# Patient Record
Sex: MALE | Race: WHITE | Employment: FULL TIME | URBAN - METROPOLITAN AREA
[De-identification: names, ages, dates, MRNs, and addresses within clinical notes are randomized per-mention and may not be internally consistent; named-entity substitution may affect disease eponyms.]

---

## 2017-09-27 ENCOUNTER — ALLIED HEALTH/NURSE VISIT (OUTPATIENT)
Dept: FAMILY MEDICINE | Facility: CLINIC | Age: 42
End: 2017-09-27
Payer: COMMERCIAL

## 2017-09-27 DIAGNOSIS — Z23 NEED FOR PROPHYLACTIC VACCINATION AND INOCULATION AGAINST INFLUENZA: Primary | ICD-10-CM

## 2017-09-27 PROCEDURE — 90471 IMMUNIZATION ADMIN: CPT

## 2017-09-27 PROCEDURE — 99207 ZZC NO CHARGE NURSE ONLY: CPT

## 2017-09-27 PROCEDURE — 90686 IIV4 VACC NO PRSV 0.5 ML IM: CPT

## 2017-09-27 NOTE — PROGRESS NOTES
Injectable Influenza Immunization Documentation    1.  Is the person to be vaccinated sick today?   No    2. Does the person to be vaccinated have an allergy to a component   of the vaccine?   No    3. Has the person to be vaccinated ever had a serious reaction   to influenza vaccine in the past?   No    4. Has the person to be vaccinated ever had Guillain-Barré syndrome?   No    Form completed by Zeynep Luna CMA

## 2017-09-27 NOTE — MR AVS SNAPSHOT
After Visit Summary   9/27/2017    Pavel Perez    MRN: [id number]           Patient Information     Date Of Birth          1975        Visit Information        Provider Department      9/27/2017 3:30 PM RG FLU SHOT [place] [facility]        Today's Diagnoses     Need for prophylactic vaccination and inoculation against influenza    -  1       Follow-ups after your visit        Who to contact     If you have questions or need follow up information about today's clinic visit or your schedule please contact [facility] [place] directly at [phone].  Normal or non-critical lab and imaging results will be communicated to you by Ember Entertainmenthart, letter or phone within 4 business days after the clinic has received the results. If you do not hear from us within 7 days, please contact the clinic through Pipettet or phone. If you have a critical or abnormal lab result, we will notify you by phone as soon as possible.  Submit refill requests through SkyFuel or call your pharmacy and they will forward the refill request to us. Please allow 3 business days for your refill to be completed.          Additional Information About Your Visit        MyChart Information     [employer] gives you secure access to your electronic health record. If you see a primary care provider, you can also send messages to your care team and make appointments. If you have questions, please call your primary care clinic.  If you do not have a primary care provider, please call [phone] and they will assist you.        Care EveryWhere ID     This is your Care EveryWhere ID. This could be used by other organizations to access your [place] medical records  [id number]         Blood Pressure from Last 3 Encounters:   02/08/16 120/82   09/17/15 128/84   12/19/13 128/80    Weight from Last 3 Encounters:   02/08/16 232 lb 6.4 oz (105.4 kg)   09/17/15 239 lb 3.2 oz (108.5 kg)   12/19/13 236 lb 12.8 oz (107.4 kg)              We  Performed the Following     FLU VAC, SPLIT VIRUS IM > 3 YO (QUADRIVALENT) [90197]     Vaccine Administration, Initial [74874]        Primary Care Provider Office Phone # Fax #    Amrita Garcia [phone] [address]  [place] [address]        Equal Access to Services     [facility]: Hadii aad ku hadasho Soomaali, waaxda luqadaha, qaybta kaalmada adeegyada, waxay idiin hayaan adeeg kharash lacoran . So [facility] [phone].    ATENCIÓN: Si habla español, tiene a cain disposición servicios gratuitos de asistencia lingüística. Opal al [phone].    We comply with applicable federal civil rights laws and [place] laws. We do not discriminate on the basis of race, color, national origin, age, disability sex, sexual orientation or gender identity.            Thank you!     Thank you for choosing [facility]  for your care. Our goal is always to provide you with excellent care. Hearing back from our patients is one way we can continue to improve our services. Please take a few minutes to complete the written survey that you may receive in the mail after your visit with us. Thank you!             Your Updated Medication List - Protect others around you: Learn how to safely use, store and throw away your medicines at www.disposemymeds.org.          This list is accurate as of: 9/27/17  4:21 PM.  Always use your most recent med list.                   Brand Name Dispense Instructions for use Diagnosis    CLARITIN-D 24 HOUR  MG per 24 hr tablet   Generic drug:  loratadine-pseudoePHEDrine     90 tablet    TAKE 1 TABLET BY MOUTH EVERY DAY    Seasonal allergic rhinitis       NAPHCON 0.012 % ophthalmic solution   Generic drug:  naphazoline      1 drop 4 times daily.        triamcinolone 0.1 % cream    KENALOG    60 g    Apply  topically 2 times daily.    Eczema       TYLENOL 325 MG tablet   Generic drug:  acetaminophen      as needed

## 2018-10-23 ENCOUNTER — ALLIED HEALTH/NURSE VISIT (OUTPATIENT)
Dept: FAMILY MEDICINE | Facility: CLINIC | Age: 43
End: 2018-10-23
Payer: COMMERCIAL

## 2018-10-23 DIAGNOSIS — Z23 NEED FOR PROPHYLACTIC VACCINATION AND INOCULATION AGAINST INFLUENZA: Primary | ICD-10-CM

## 2018-10-23 PROCEDURE — 90471 IMMUNIZATION ADMIN: CPT

## 2018-10-23 PROCEDURE — 99207 ZZC NO CHARGE NURSE ONLY: CPT

## 2018-10-23 PROCEDURE — 90686 IIV4 VACC NO PRSV 0.5 ML IM: CPT

## 2018-10-23 NOTE — MR AVS SNAPSHOT
After Visit Summary   10/23/2018    Pavel Perez    MRN: [id number]           Patient Information     Date Of Birth          1975        Visit Information        Provider Department      10/23/2018 3:30 PM RG FLU SHOT [place] [facility] Gamboa        Today's Diagnoses     Need for prophylactic vaccination and inoculation against influenza    -  1       Follow-ups after your visit        Who to contact     If you have questions or need follow up information about today's clinic visit or your schedule please contact [facility] [place] directly at [phone].  Normal or non-critical lab and imaging results will be communicated to you by NativeEnergyhart, letter or phone within 4 business days after the clinic has received the results. If you do not hear from us within 7 days, please contact the clinic through Nebulat or phone. If you have a critical or abnormal lab result, we will notify you by phone as soon as possible.  Submit refill requests through Geomagic or call your pharmacy and they will forward the refill request to us. Please allow 3 business days for your refill to be completed.          Additional Information About Your Visit        MyChart Information     [employer] gives you secure access to your electronic health record. If you see a primary care provider, you can also send messages to your care team and make appointments. If you have questions, please call your primary care clinic.  If you do not have a primary care provider, please call [phone] and they will assist you.        Care EveryWhere ID     This is your Care EveryWhere ID. This could be used by other organizations to access your [place] medical records  [id number]         Blood Pressure from Last 3 Encounters:   02/08/16 120/82   09/17/15 128/84   12/19/13 128/80    Weight from Last 3 Encounters:   02/08/16 232 lb 6.4 oz (105.4 kg)   09/17/15 239 lb 3.2 oz (108.5 kg)   12/19/13 236 lb 12.8 oz (107.4 kg)              We  Performed the Following     FLU VACCINE, SPLIT VIRUS, IM (QUADRIVALENT) [58715]- >3 YRS     Vaccine Administration, Initial [47212]        Primary Care Provider Office Phone # Fax #    Amrita Garcia [phone] [address] [facility] [address] [address]        Equal Access to Services     [facility]: Hadii aad ku hadasho Soomaali, waaxda luqadaha, qaybta kaalmada adeegyada, waxay idiin hayaan adeeg kharash laChipaan . So [facility] [phone].    ATENCIÓN: Si habla español, tiene a cain disposición servicios gratuitos de asistencia lingüística. Opal al [phone].    We comply with applicable federal civil rights laws and [place] laws. We do not discriminate on the basis of race, color, national origin, age, disability, sex, sexual orientation, or gender identity.            Thank you!     Thank you for choosing [facility]  for your care. Our goal is always to provide you with excellent care. Hearing back from our patients is one way we can continue to improve our services. Please take a few minutes to complete the written survey that you may receive in the mail after your visit with us. Thank you!             Your Updated Medication List - Protect others around you: Learn how to safely use, store and throw away your medicines at www.disposemymeds.org.          This list is accurate as of 10/23/18  3:52 PM.  Always use your most recent med list.                   Brand Name Dispense Instructions for use Diagnosis    CLARITIN-D 24 HOUR  MG per 24 hr tablet   Generic drug:  loratadine-pseudoePHEDrine     90 tablet    TAKE 1 TABLET BY MOUTH EVERY DAY    Seasonal allergic rhinitis       NAPHCON 0.012 % ophthalmic solution   Generic drug:  naphazoline      1 drop 4 times daily.        triamcinolone 0.1 % cream    KENALOG    60 g    Apply  topically 2 times daily.    Eczema       TYLENOL 325 MG tablet   Generic drug:  acetaminophen      as needed

## 2018-10-23 NOTE — PROGRESS NOTES

## 2019-09-11 ENCOUNTER — ANCILLARY PROCEDURE (OUTPATIENT)
Dept: GENERAL RADIOLOGY | Facility: CLINIC | Age: 44
End: 2019-09-11
Attending: PHYSICIAN ASSISTANT
Payer: COMMERCIAL

## 2019-09-11 ENCOUNTER — OFFICE VISIT (OUTPATIENT)
Dept: FAMILY MEDICINE | Facility: CLINIC | Age: 44
End: 2019-09-11
Payer: COMMERCIAL

## 2019-09-11 VITALS
DIASTOLIC BLOOD PRESSURE: 64 MMHG | WEIGHT: 239.8 LBS | BODY MASS INDEX: 35.52 KG/M2 | RESPIRATION RATE: 20 BRPM | SYSTOLIC BLOOD PRESSURE: 116 MMHG | HEART RATE: 95 BPM | HEIGHT: 69 IN | TEMPERATURE: 98.5 F | OXYGEN SATURATION: 94 %

## 2019-09-11 DIAGNOSIS — J18.9 PNEUMONIA OF RIGHT LOWER LOBE DUE TO INFECTIOUS ORGANISM: Primary | ICD-10-CM

## 2019-09-11 DIAGNOSIS — R05.9 COUGH: ICD-10-CM

## 2019-09-11 DIAGNOSIS — R50.9 FEVER AND CHILLS: ICD-10-CM

## 2019-09-11 DIAGNOSIS — E66.01 MORBID OBESITY (H): ICD-10-CM

## 2019-09-11 LAB
BASOPHILS # BLD AUTO: 0 10E9/L (ref 0–0.2)
BASOPHILS NFR BLD AUTO: 0.3 %
DIFFERENTIAL METHOD BLD: ABNORMAL
EOSINOPHIL # BLD AUTO: 0 10E9/L (ref 0–0.7)
EOSINOPHIL NFR BLD AUTO: 0.4 %
ERYTHROCYTE [DISTWIDTH] IN BLOOD BY AUTOMATED COUNT: 12.6 % (ref 10–15)
HCT VFR BLD AUTO: 44.5 % (ref 40–53)
HGB BLD-MCNC: 15.2 G/DL (ref 13.3–17.7)
LYMPHOCYTES # BLD AUTO: 0.6 10E9/L (ref 0.8–5.3)
LYMPHOCYTES NFR BLD AUTO: 8 %
MCH RBC QN AUTO: 29.6 PG (ref 26.5–33)
MCHC RBC AUTO-ENTMCNC: 34.2 G/DL (ref 31.5–36.5)
MCV RBC AUTO: 87 FL (ref 78–100)
MONOCYTES # BLD AUTO: 0.8 10E9/L (ref 0–1.3)
MONOCYTES NFR BLD AUTO: 11.2 %
NEUTROPHILS # BLD AUTO: 6 10E9/L (ref 1.6–8.3)
NEUTROPHILS NFR BLD AUTO: 80.1 %
PLATELET # BLD AUTO: 167 10E9/L (ref 150–450)
RBC # BLD AUTO: 5.13 10E12/L (ref 4.4–5.9)
WBC # BLD AUTO: 7.5 10E9/L (ref 4–11)

## 2019-09-11 PROCEDURE — 99214 OFFICE O/P EST MOD 30 MIN: CPT | Performed by: PHYSICIAN ASSISTANT

## 2019-09-11 PROCEDURE — 85025 COMPLETE CBC W/AUTO DIFF WBC: CPT | Performed by: PHYSICIAN ASSISTANT

## 2019-09-11 PROCEDURE — 71046 X-RAY EXAM CHEST 2 VIEWS: CPT | Mod: FY

## 2019-09-11 PROCEDURE — 36415 COLL VENOUS BLD VENIPUNCTURE: CPT | Performed by: PHYSICIAN ASSISTANT

## 2019-09-11 RX ORDER — ALBUTEROL SULFATE 90 UG/1
2 AEROSOL, METERED RESPIRATORY (INHALATION) EVERY 6 HOURS
Qty: 1 INHALER | Refills: 0 | Status: SHIPPED | OUTPATIENT
Start: 2019-09-11

## 2019-09-11 RX ORDER — DOXYCYCLINE 100 MG/1
100 CAPSULE ORAL 2 TIMES DAILY
Qty: 20 CAPSULE | Refills: 0 | Status: SHIPPED | OUTPATIENT
Start: 2019-09-11 | End: 2020-05-04

## 2019-09-11 RX ORDER — INHALER, ASSIST DEVICES
SPACER (EA) MISCELLANEOUS
Qty: 1 EACH | Refills: 0 | Status: SHIPPED | OUTPATIENT
Start: 2019-09-11

## 2019-09-11 ASSESSMENT — PAIN SCALES - GENERAL: PAINLEVEL: MODERATE PAIN (4)

## 2019-09-11 ASSESSMENT — MIFFLIN-ST. JEOR: SCORE: 1972.1

## 2019-09-11 NOTE — PROGRESS NOTES
"Subjective     Pavel Nando Perez is a 43 year old male who presents to clinic today for the following health issues:    HPI   Acute Illness   Acute illness concerns: cough headache  Onset: 09/05/2019- about 6 days  Sx started with little tickle annoying in throat and cough. Then worsening sore throat. Then started with headache over the weekend. Felt feverish over weekend, hot, cold, sweats. Body aches, headaches.   Cough phlegmy at times but more dry, hacking.  No CP. No SOB. No pleuritic pain.  Little SOB with \"running up the steps\".   Never misses work. Hasn't gone this week.   Last fever reducing med- advil 400mg at 3am (7 hr ago). Temp here 98.5  Eating- lower normal but eating.    Drinking- able to empty bladder every few hrs, but less.   Wife is with today and reports he is \"never sick\". Reminds her of when he had influenza years ago.    Fever: YES- Tmax to 102 and sweats. Then 96.9 yesterday.     Chills/Sweats: YES    Headache (location?): YES    Sinus Pressure:no    Conjunctivitis:  no    Ear Pain: no    Rhinorrhea: YES    Congestion: YES- chest congestion.     Sore Throat: YES     Cough: YES-non-productive    Wheeze: no     Decreased Appetite: YES    Nausea: no     Vomiting: no     Diarrhea:  no     Dysuria/Freq.: no     Fatigue/Achiness: YES    Sick/Strep Exposure: no     No GI sx. No rash. No LE edema/swelling.   No asthma. +seasonal allergies, treating.  No hx of pneumonia.  No travel. No exposures.   Never smoker.    Therapies Tried and outcome: OTC meds, rest, flavored water, and esstional oils       Patient Active Problem List   Diagnosis     Atopic dermatitis     CARDIOVASCULAR SCREENING; LDL GOAL LESS THAN 160     Ulcerative (chronic) proctitis (H)     Arthritis     Past Surgical History:   Procedure Laterality Date     C NONSPECIFIC PROCEDURE      left 2nd finger sugery       Social History     Tobacco Use     Smoking status: Never Smoker     Smokeless tobacco: Never Used   Substance Use Topics     " Alcohol use: Yes     Comment: occasionally     Family History   Problem Relation Age of Onset     Cancer Mother         throat cancer     Cancer Paternal Grandfather         liver cancer     Cancer Paternal Aunt      Asthma No family hx of      C.A.D. No family hx of      Diabetes No family hx of      Hypertension No family hx of      Cerebrovascular Disease No family hx of      Breast Cancer No family hx of      Cancer - colorectal No family hx of      Prostate Cancer No family hx of      Alcohol/Drug No family hx of      Allergies No family hx of      Alzheimer Disease No family hx of      Anesthesia Reaction No family hx of      Arthritis No family hx of      Blood Disease No family hx of      Circulatory No family hx of      Connective Tissue Disorder No family hx of      Congenital Anomalies No family hx of      Cardiovascular No family hx of      Depression No family hx of      Endocrine Disease No family hx of      Eye Disorder No family hx of      Genetic Disorder No family hx of      Gastrointestinal Disease No family hx of      Genitourinary Problems No family hx of      Gynecology No family hx of      Heart Disease No family hx of      Lipids No family hx of      Musculoskeletal Disorder No family hx of      Neurologic Disorder No family hx of      Obesity No family hx of      Osteoporosis No family hx of      Psychotic Disorder No family hx of      Respiratory No family hx of      Thyroid Disease No family hx of      Hearing Loss No family hx of      Family History Negative No family hx of          Current Outpatient Medications   Medication Sig Dispense Refill     Ascorbic Acid (VITAMIN C PO)        Cholecalciferol (VITAMIN D PO)        CLARITIN-D 24 HOUR  MG per tablet TAKE 1 TABLET BY MOUTH EVERY DAY 90 tablet 0     Ibuprofen (ADVIL PO)        Multiple Vitamins-Minerals (MULTIVITAMIN PO)        naphazoline (NAPHCON) 0.012 % ophthalmic solution 1 drop 4 times daily.       Omega-3 Fatty Acids  "(FISH OIL PO)        Pseudoephedrine-guaiFENesin (MUCINEX D PO)        triamcinolone (KENALOG) 0.1 % cream Apply  topically 2 times daily. 60 g 3     TYLENOL 325 MG OR TABS as needed        No Known Allergies  BP Readings from Last 3 Encounters:   09/11/19 116/64   02/08/16 120/82   09/17/15 128/84    Wt Readings from Last 3 Encounters:   09/11/19 108.8 kg (239 lb 12.8 oz)   02/08/16 105.4 kg (232 lb 6.4 oz)   09/17/15 108.5 kg (239 lb 3.2 oz)                      Reviewed and updated as needed this visit by Provider  Med Hx         Review of Systems   ROS COMP: Constitutional, HEENT, cardiovascular, pulmonary, gi and gu systems are negative, except as otherwise noted.      Objective    /64 (BP Location: Left arm, Patient Position: Sitting, Cuff Size: Adult Regular)   Pulse 95   Temp 98.5  F (36.9  C) (Temporal)   Resp 20   Ht 1.751 m (5' 8.94\")   Wt 108.8 kg (239 lb 12.8 oz)   SpO2 94%   BMI 35.48 kg/m    Body mass index is 35.48 kg/m .  Physical Exam   GENERAL: healthy, alert and no distress  EYES: Eyes grossly normal to inspection, PERRL and conjunctivae and sclerae normal  HENT: ear canals and TM's normal, nose and mouth without ulcers or lesions  NECK: no adenopathy, no asymmetry, masses, or scars and thyroid normal to palpation  RESP:soft crackles right lung base with coughing and deep breathing, no rhonchi or wheezes  CV: regular rate and rhythm, normal S1 S2, no S3 or S4, no murmur, click or rub, no peripheral edema and peripheral pulses strong  ABDOMEN: soft, nontender, no hepatosplenomegaly, no masses and bowel sounds normal  MS: no gross musculoskeletal defects noted, no edema, no calf tenderness  NEURO: Normal strength and tone, mentation intact and speech normal  PSYCH: mentation appears normal, affect normal/bright    Diagnostic Test Results:  Results for orders placed or performed in visit on 09/11/19 (from the past 24 hour(s))   CBC with platelets and differential   Result Value Ref " Range    WBC 7.5 4.0 - 11.0 10e9/L    RBC Count 5.13 4.4 - 5.9 10e12/L    Hemoglobin 15.2 13.3 - 17.7 g/dL    Hematocrit 44.5 40.0 - 53.0 %    MCV 87 78 - 100 fl    MCH 29.6 26.5 - 33.0 pg    MCHC 34.2 31.5 - 36.5 g/dL    RDW 12.6 10.0 - 15.0 %    Platelet Count 167 150 - 450 10e9/L    % Neutrophils 80.1 %    % Lymphocytes 8.0 %    % Monocytes 11.2 %    % Eosinophils 0.4 %    % Basophils 0.3 %    Absolute Neutrophil 6.0 1.6 - 8.3 10e9/L    Absolute Lymphocytes 0.6 (L) 0.8 - 5.3 10e9/L    Absolute Monocytes 0.8 0.0 - 1.3 10e9/L    Absolute Eosinophils 0.0 0.0 - 0.7 10e9/L    Absolute Basophils 0.0 0.0 - 0.2 10e9/L    Diff Method Automated Method      Xray -   Xr Chest 2 Views    Result Date: 9/11/2019  CHEST TWO VIEWS September 11, 2019 10:32 AM HISTORY: Cough, fever for five days. Fever and chills. COMPARISON: None.     IMPRESSION: Hypoinflated lungs. Bibasilar atelectasis. Difficult to completely exclude acute airspace disease at the lower lungs. Normal cardiac silhouette. No effusions.          Assessment & Plan     1. Pneumonia of right lower lobe due to infectious organism (H)  Clinically exam concerning for pneumonia. Cxr report as above- discussed with pt.  Start abx as below. Discussed possible side effects, how to take.   Albuterol for coughing sx  Discussed s/sx for repeat care if worsening  Discussed repeating cxr in 6-8 weeks if not back to baseline   - doxycycline hyclate (VIBRAMYCIN) 100 MG capsule; Take 1 capsule (100 mg) by mouth 2 times daily  Dispense: 20 capsule; Refill: 0  - albuterol (PROAIR HFA/PROVENTIL HFA/VENTOLIN HFA) 108 (90 Base) MCG/ACT inhaler; Inhale 2 puffs into the lungs every 6 hours  Dispense: 1 Inhaler; Refill: 0  - spacer (OPTICHAMBER SUZANNE) holding chamber; To use with albuterol inhaler  Dispense: 1 each; Refill: 0    2. Cough  - XR Chest 2 Views; Future  - CBC with platelets and differential    3. Fever and chills  - XR Chest 2 Views; Future  - CBC with platelets and  "differential    4. Morbid obesity (H)  Encourage efforts at weight loss for long term health benefits        BMI:   Estimated body mass index is 35.48 kg/m  as calculated from the following:    Height as of this encounter: 1.751 m (5' 8.94\").    Weight as of this encounter: 108.8 kg (239 lb 12.8 oz).   Weight management plan: Discussed healthy diet and exercise guidelines        Follow Up: The patient was instructed to contact clinic for worsening symptoms, non-improvement as expected/discussed, and for questions regarding medications or treatment plan. Discussed parameters for follow up and included in After Visit Summary given to patient.      Return in about 8 weeks (around 11/6/2019) for Recheck. sooner if not improving as expected    Amarilys Verdugo PA-C  [facility] [place]      "

## 2019-09-11 NOTE — PATIENT INSTRUCTIONS
Start the doxycycline 100mg twice daily for 10 days   Finish the full course of the antibiotics  Take with food  Separate from calcium and multivitamin    To be seen again urgently if worsening breathing sx, shortness of breath at rest, chest pain, vomiting, confusion.     Ok to use over the counter ibuprofen or tylenol for fever and symptoms as needed    Albuterol 2 puffs every 4-6hr for cough

## 2019-09-11 NOTE — LETTER
[facility] [place]  [address]  [place] [address]  Phone: [phone]  Fax: [phone]    September 11, 2019        Pavel Perez  [address] [place] [address]          To whom it may concern:    RE: Pavel Perez    Patient was seen and treated today at our clinic and missed work. Please excuse work absence due to illness the week of 09/09/2019- 09/13/2019 as needed.     Please contact me for questions or concerns.      Sincerely,        Amarilys Verdugo PA-C

## 2019-10-25 ENCOUNTER — IMMUNIZATION (OUTPATIENT)
Dept: FAMILY MEDICINE | Facility: CLINIC | Age: 44
End: 2019-10-25
Payer: COMMERCIAL

## 2019-10-25 DIAGNOSIS — Z23 NEED FOR PROPHYLACTIC VACCINATION AND INOCULATION AGAINST INFLUENZA: Primary | ICD-10-CM

## 2019-10-25 PROCEDURE — 90471 IMMUNIZATION ADMIN: CPT

## 2019-10-25 PROCEDURE — 90686 IIV4 VACC NO PRSV 0.5 ML IM: CPT

## 2019-10-25 PROCEDURE — 99207 ZZC NO CHARGE NURSE ONLY: CPT

## 2019-11-08 ENCOUNTER — HEALTH MAINTENANCE LETTER (OUTPATIENT)
Age: 44
End: 2019-11-08

## 2020-03-12 ENCOUNTER — TRANSFERRED RECORDS (OUTPATIENT)
Dept: HEALTH INFORMATION MANAGEMENT | Facility: CLINIC | Age: 45
End: 2020-03-12

## 2020-04-07 ENCOUNTER — MYC MEDICAL ADVICE (OUTPATIENT)
Dept: FAMILY MEDICINE | Facility: CLINIC | Age: 45
End: 2020-04-07

## 2020-05-01 NOTE — PROGRESS NOTES
"Pavel Perez is a 44 year old male who is being evaluated via a billable video visit.      The patient has been notified of following:     \"This video visit will be conducted via a call between you and your physician/provider. We have found that certain health care needs can be provided without the need for an in-person physical exam.  This service lets us provide the care you need with a video conversation.  If a prescription is necessary we can send it directly to your pharmacy.  If lab work is needed we can place an order for that and you can then stop by our lab to have the test done at a later time.    Video visits are billed at different rates depending on your insurance coverage.  Please reach out to your insurance provider with any questions.    If during the course of the call the physician/provider feels a video visit is not appropriate, you will not be charged for this service.\"    Patient has given verbal consent for Video visit? Yes    How would you like to obtain your AVS? E-Mail (inform patient AVS not encrypted)  [email or web address]     Patient would like the video invitation sent by: Text to cell phone: .    Will anyone else be joining your video visit? No        Subjective     Pavel Perez is a 44 year old male who presents to clinic today for the following health issues:    HPI  Rash  Onset: Early February 2020    Description:   Location:  Eyes and above eyebrows   Character: red  Itching (Pruritis): no     Progression of Symptoms: Same.     Accompanying Signs & Symptoms:  Fever: no   Body aches or joint pain: no   Sore throat symptoms: no   Recent cold symptoms: no     History:   Previous similar rash: no     Precipitating factors:   Exposure to similar rash: no   New exposures: None   Recent travel: no     Alleviating factors:  none    Therapies Tried and outcome: [facility] and was prescribed Prednisone  In March. Helped with symptoms now they are back.        Video " Start Time: 0920    Has dry skin on eyelid creases- chronically. Both eyelids became inflamed and red, flaky, now swollen and somewhat tender over the past 2 days.     No scleral issues, no watering, no other URI or allergy symptoms.     No fever.   Normal vision.       Patient Active Problem List   Diagnosis     Atopic dermatitis     CARDIOVASCULAR SCREENING; LDL GOAL LESS THAN 160     Ulcerative (chronic) proctitis (H)     Arthritis     Obesity (BMI 35.0-39.9) with comorbidity (H)     Past Surgical History:   Procedure Laterality Date     C NONSPECIFIC PROCEDURE      left 2nd finger sugery       Social History     Tobacco Use     Smoking status: Never Smoker     Smokeless tobacco: Never Used   Substance Use Topics     Alcohol use: Yes     Comment: occasionally     Family History   Problem Relation Age of Onset     Cancer Mother         throat cancer     Cancer Paternal Grandfather         liver cancer     Cancer Paternal Aunt      Asthma No family hx of      C.A.D. No family hx of      Diabetes No family hx of      Hypertension No family hx of      Cerebrovascular Disease No family hx of      Breast Cancer No family hx of      Cancer - colorectal No family hx of      Prostate Cancer No family hx of      Alcohol/Drug No family hx of      Allergies No family hx of      Alzheimer Disease No family hx of      Anesthesia Reaction No family hx of      Arthritis No family hx of      Blood Disease No family hx of      Circulatory No family hx of      Connective Tissue Disorder No family hx of      Congenital Anomalies No family hx of      Cardiovascular No family hx of      Depression No family hx of      Endocrine Disease No family hx of      Eye Disorder No family hx of      Genetic Disorder No family hx of      Gastrointestinal Disease No family hx of      Genitourinary Problems No family hx of      Gynecology No family hx of      Heart Disease No family hx of      Lipids No family hx of      Musculoskeletal Disorder  "No family hx of      Neurologic Disorder No family hx of      Obesity No family hx of      Osteoporosis No family hx of      Psychotic Disorder No family hx of      Respiratory No family hx of      Thyroid Disease No family hx of      Hearing Loss No family hx of      Family History Negative No family hx of            Reviewed and updated as needed this visit by Provider  Tobacco  Allergies  Meds  Problems  Med Hx  Surg Hx  Fam Hx         Review of Systems   ROS COMP: Constitutional, HEENT, cardiovascular, pulmonary, gi and gu systems are negative, except as otherwise noted.      Objective    There were no vitals taken for this visit.  Estimated body mass index is 35.48 kg/m  as calculated from the following:    Height as of 9/11/19: 1.751 m (5' 8.94\").    Weight as of 9/11/19: 108.8 kg (239 lb 12.8 oz).  Physical Exam     GENERAL: healthy, alert and no distress  EYES: eyelids- red and swollen lids, difficult to see skin- pixilated   and sclera are clear, no drainage.   NECK: no asymmetry, masses or scars  RESP: no audible wheeze, cough, or visible cyanosis.  No visible retractions or increased work of breathing.  Able to speak fully in complete sentences.  NEURO: Cranial nerves grossly intact, mentation intact and speech normal  PSYCH: mentation appears normal, affect normal/bright, judgement and insight intact, normal speech and appearance well-groomed      Diagnostic Test Results:  Labs reviewed in Epic        Assessment & Plan       ICD-10-CM    1. Flexural atopic dermatitis  L20.89 triamcinolone (KENALOG) 0.1 % external ointment   2. Cellulitis of face  L03.211 cephALEXin (KEFLEX) 500 MG capsule        BMI:   Estimated body mass index is 35.48 kg/m  as calculated from the following:    Height as of 9/11/19: 1.751 m (5' 8.94\").    Weight as of 9/11/19: 108.8 kg (239 lb 12.8 oz).   Weight management plan: not addressed      Overdue for PE and labs.   Pt. Agrees and will schedule post-covid as is current " reason for video visit.     COvering for atopic derm and bacterial infection.   Cool dry compresses. Avoid rubbing skin. Discussed etiology of skin issue and cares.     F/u if not better in person in 2-3 days.         Return in about 4 months (around 9/4/2020) for Physical Exam.    TAMIKA Sarkar CNP  [facility]      Video-Visit Details    Type of service:  Video Visit    Video End Time:0927    Originating Location (pt. Location): Home    Distant Location (provider location):  [facility]     Platform used for Video Visit: Valente    Return in about 4 months (around 9/4/2020) for Physical Exam.       TAMIKA Sarkar CNP

## 2020-05-04 ENCOUNTER — VIRTUAL VISIT (OUTPATIENT)
Dept: FAMILY MEDICINE | Facility: OTHER | Age: 45
End: 2020-05-04
Payer: COMMERCIAL

## 2020-05-04 DIAGNOSIS — L20.89 FLEXURAL ATOPIC DERMATITIS: Primary | ICD-10-CM

## 2020-05-04 DIAGNOSIS — L03.211 CELLULITIS OF FACE: ICD-10-CM

## 2020-05-04 PROCEDURE — 99213 OFFICE O/P EST LOW 20 MIN: CPT | Mod: GT | Performed by: NURSE PRACTITIONER

## 2020-05-04 RX ORDER — CEPHALEXIN 500 MG/1
500 CAPSULE ORAL 3 TIMES DAILY
Qty: 21 CAPSULE | Refills: 0 | Status: SHIPPED | OUTPATIENT
Start: 2020-05-04 | End: 2020-05-11

## 2020-05-04 RX ORDER — TRIAMCINOLONE ACETONIDE 1 MG/G
OINTMENT TOPICAL 2 TIMES DAILY
Qty: 15 G | Refills: 1 | Status: SHIPPED | OUTPATIENT
Start: 2020-05-04

## 2020-12-06 ENCOUNTER — HEALTH MAINTENANCE LETTER (OUTPATIENT)
Age: 45
End: 2020-12-06

## 2021-09-25 ENCOUNTER — HEALTH MAINTENANCE LETTER (OUTPATIENT)
Age: 46
End: 2021-09-25

## 2022-01-15 ENCOUNTER — HEALTH MAINTENANCE LETTER (OUTPATIENT)
Age: 47
End: 2022-01-15

## 2023-01-07 ENCOUNTER — HEALTH MAINTENANCE LETTER (OUTPATIENT)
Age: 48
End: 2023-01-07

## 2023-04-22 ENCOUNTER — HEALTH MAINTENANCE LETTER (OUTPATIENT)
Age: 48
End: 2023-04-22

## 2025-02-05 ENCOUNTER — MYC MEDICAL ADVICE (OUTPATIENT)
Dept: FAMILY MEDICINE | Facility: CLINIC | Age: 50
End: 2025-02-05
Payer: COMMERCIAL

## 2025-02-06 NOTE — TELEPHONE ENCOUNTER
RN Triage    Patient Contact    Attempt # 1    Was call answered?  No.  Left message on voicemail with information to call me back. and sent [employer] message.    Anthony Mo RN on 2/6/2025 at 12:07 PM

## 2025-02-06 NOTE — TELEPHONE ENCOUNTER
Patient returned call. He states most of his concerns have been present since between Thanksgiving and Christmas.     Colitis flare is still ongoing. No improvement since it started. Blood is not present every time he passes a stool but does occur frequently. Stool is mostly liquid. Unable to tell if there is clots present but patient does not recall seeing any.    He has lost about 8-10 pounds since November/December unintentionally. He has not made any dietary changes to contribute to the weight loss.     Patient is continuously thirsty despite drinking 90 ounces of water per day. He has increased to more than 90 ounces over the past couple weeks with still having the sensation of thirst. He recently started mixing in electrolyte powder into his water and reports this has helped curb the thirst sensation some.    Gout flare around Christmas. Still some mild swelling but pain has resolved.     Patient requesting to see either PCP or Modesta Vernon.  Due to most concerns being ongoing for a few months and not getting worse, scheduled patient to be seen in office on Monday, 2/10/25 with Modesta. Instructed patient that if new symptoms develop or any worsening symptoms to call back and speak with triage.     Forwarding to provider as FYI. Please advise if not appropriate to wait until Monday and will redirect patient.     Radha AVELARN, RN

## 2025-02-06 NOTE — TELEPHONE ENCOUNTER
Advise same day with Nicole Downey- tomorrow. We need to get things going for him.   TAMIKA Sarkar CNP

## 2025-02-07 NOTE — TELEPHONE ENCOUNTER
RN Triage    Patient Contact    Attempt # 1    Was call answered?  No.  Left message on voicemail with information to call me back.    Krysten Phelps RN on 2/7/2025 at 9:41 AM

## 2025-02-08 ENCOUNTER — HEALTH MAINTENANCE LETTER (OUTPATIENT)
Age: 50
End: 2025-02-08

## 2025-02-10 ENCOUNTER — OFFICE VISIT (OUTPATIENT)
Dept: FAMILY MEDICINE | Facility: CLINIC | Age: 50
End: 2025-02-10
Payer: COMMERCIAL

## 2025-02-10 VITALS
OXYGEN SATURATION: 97 % | BODY MASS INDEX: 33.49 KG/M2 | TEMPERATURE: 96.9 F | SYSTOLIC BLOOD PRESSURE: 112 MMHG | HEIGHT: 69 IN | RESPIRATION RATE: 14 BRPM | HEART RATE: 91 BPM | WEIGHT: 226.1 LBS | DIASTOLIC BLOOD PRESSURE: 80 MMHG

## 2025-02-10 DIAGNOSIS — M10.9 GOUT INVOLVING TOE OF LEFT FOOT, UNSPECIFIED CAUSE, UNSPECIFIED CHRONICITY: ICD-10-CM

## 2025-02-10 DIAGNOSIS — K52.9 COLITIS: Primary | ICD-10-CM

## 2025-02-10 DIAGNOSIS — Z13.220 SCREENING FOR LIPOID DISORDERS: ICD-10-CM

## 2025-02-10 LAB
ALBUMIN SERPL BCG-MCNC: 3.8 G/DL (ref 3.5–5.2)
ALP SERPL-CCNC: 60 U/L (ref 40–150)
ALT SERPL W P-5'-P-CCNC: 37 U/L (ref 0–70)
ANION GAP SERPL CALCULATED.3IONS-SCNC: 11 MMOL/L (ref 7–15)
AST SERPL W P-5'-P-CCNC: 40 U/L (ref 0–45)
BILIRUB SERPL-MCNC: 0.3 MG/DL
BUN SERPL-MCNC: 10.9 MG/DL (ref 6–20)
CALCIUM SERPL-MCNC: 9.1 MG/DL (ref 8.8–10.4)
CHLORIDE SERPL-SCNC: 100 MMOL/L (ref 98–107)
CREAT SERPL-MCNC: 0.88 MG/DL (ref 0.67–1.17)
CRP SERPL-MCNC: 28.2 MG/L
EGFRCR SERPLBLD CKD-EPI 2021: >90 ML/MIN/1.73M2
ERYTHROCYTE [DISTWIDTH] IN BLOOD BY AUTOMATED COUNT: 12.4 % (ref 10–15)
ERYTHROCYTE [SEDIMENTATION RATE] IN BLOOD BY WESTERGREN METHOD: 39 MM/HR (ref 0–15)
GLUCOSE SERPL-MCNC: 97 MG/DL (ref 70–99)
HCO3 SERPL-SCNC: 26 MMOL/L (ref 22–29)
HCT VFR BLD AUTO: 40 % (ref 40–53)
HGB BLD-MCNC: 13.1 G/DL (ref 13.3–17.7)
HOLD SPECIMEN: NORMAL
MCH RBC QN AUTO: 28.7 PG (ref 26.5–33)
MCHC RBC AUTO-ENTMCNC: 32.8 G/DL (ref 31.5–36.5)
MCV RBC AUTO: 88 FL (ref 78–100)
PLATELET # BLD AUTO: 330 10E3/UL (ref 150–450)
POTASSIUM SERPL-SCNC: 4.5 MMOL/L (ref 3.4–5.3)
PROT SERPL-MCNC: 7.9 G/DL (ref 6.4–8.3)
RBC # BLD AUTO: 4.56 10E6/UL (ref 4.4–5.9)
SODIUM SERPL-SCNC: 137 MMOL/L (ref 135–145)
TSH SERPL DL<=0.005 MIU/L-ACNC: 1.3 UIU/ML (ref 0.3–4.2)
URATE SERPL-MCNC: 5.9 MG/DL (ref 3.4–7)
WBC # BLD AUTO: 8.6 10E3/UL (ref 4–11)

## 2025-02-10 PROCEDURE — 80053 COMPREHEN METABOLIC PANEL: CPT | Performed by: NURSE PRACTITIONER

## 2025-02-10 PROCEDURE — 99204 OFFICE O/P NEW MOD 45 MIN: CPT | Performed by: NURSE PRACTITIONER

## 2025-02-10 PROCEDURE — 84443 ASSAY THYROID STIM HORMONE: CPT | Performed by: NURSE PRACTITIONER

## 2025-02-10 PROCEDURE — G2211 COMPLEX E/M VISIT ADD ON: HCPCS | Performed by: NURSE PRACTITIONER

## 2025-02-10 PROCEDURE — 85027 COMPLETE CBC AUTOMATED: CPT | Performed by: NURSE PRACTITIONER

## 2025-02-10 PROCEDURE — 85652 RBC SED RATE AUTOMATED: CPT | Performed by: NURSE PRACTITIONER

## 2025-02-10 PROCEDURE — 86038 ANTINUCLEAR ANTIBODIES: CPT | Performed by: NURSE PRACTITIONER

## 2025-02-10 PROCEDURE — 36415 COLL VENOUS BLD VENIPUNCTURE: CPT | Performed by: NURSE PRACTITIONER

## 2025-02-10 PROCEDURE — 86140 C-REACTIVE PROTEIN: CPT | Performed by: NURSE PRACTITIONER

## 2025-02-10 PROCEDURE — 84550 ASSAY OF BLOOD/URIC ACID: CPT | Performed by: NURSE PRACTITIONER

## 2025-02-10 ASSESSMENT — PAIN SCALES - GENERAL: PAINLEVEL_OUTOF10: NO PAIN (0)

## 2025-02-10 ASSESSMENT — ENCOUNTER SYMPTOMS: DIARRHEA: 1

## 2025-02-10 NOTE — PROGRESS NOTES
"  Assessment & Plan     Colitis  Starting Entocort.   Stool studies pending.     Sent to GI for consultation and colonoscopy follow-up.    Use risk side effects of medications discussed.  Warning signs discussed.    Advised PE.  - CBC with Platelets and Reflex to Iron Studies; Future  - Comprehensive metabolic panel; Future  - TSH with free T4 reflex; Future  - C. difficile Toxin B PCR with reflex to C. difficile EIA; Future  - Enteric Bacteria and Virus Panel by [facility] Stool; Future  - Helicobacter pylori Antigen Stool; Future  - Fecal Lactoferrin; Future  - Adult GI  Referral - Consult Only; Future  - CRP, inflammation; Future  - ESR: Erythrocyte sedimentation rate; Future  - Anti Nuclear Hortencia IgG by IFA with Reflex; Future  - CBC with Platelets and Reflex to Iron Studies  - Comprehensive metabolic panel  - TSH with free T4 reflex  - C. difficile Toxin B PCR with reflex to C. difficile EIA  - Enteric Bacteria and Virus Panel by [facility] Stool  - Helicobacter pylori Antigen Stool  - Fecal Lactoferrin  - CRP, inflammation  - ESR: Erythrocyte sedimentation rate  - Anti Nuclear Hortencia IgG by IFA with Reflex  - budesonide (ENTOCORT EC) 3 MG EC capsule; Take 3 capsules (9 mg) by mouth every morning.  - Adult GI  Referral - Procedure Only; Future    Gout involving toe of left foot, unspecified cause, unspecified chronicity  Recent flare, updating labs.   Discussed etiologies.     - Uric acid; Future  - Uric acid    Screening for lipoid disorders  For future PE.   - Lipid panel reflex to direct LDL Fasting; Future          BMI  Estimated body mass index is 33.58 kg/m  as calculated from the following:    Height as of this encounter: 1.748 m (5' 8.8\").    Weight as of this encounter: 102.6 kg (226 lb 1.6 oz).   Weight management plan: Healthy habits as able.      MEDICATIONS:        - Continue other medications without change    The longitudinal plan of care for the diagnosis(es)/condition(s) as documented were " addressed during this visit. Due to the added complexity in care, I will continue to support Pavel in the subsequent management and with ongoing continuity of care.    Subjective   Pavel is a 49 year old, presenting for the following health issues:  Diarrhea and Colitis  -Colitis flare up      2/10/2025     8:50 AM   Additional Questions   Roomed by AD   Accompanied by Self     Diarrhea    Colitis    History of Present Illness       Reason for visit:  Colitis flair with diarreha   He is taking medications regularly.     3-10 stools/day day since mid. Dec.   Wife was ill/hospitalized and he ws    No abdominal pain.   + rectal pain from wiping.     + BRB with stools.   Occ. Mild intermittent nausea.   PO is good- eating regular food.   Is able to eat raw foods.   + feeling weak last week.   But otherwise feels overall pretty good, energy was just mildly low.     Staying hydrated.     Went gluten free about 2 years ago, not able to test this as pt. Is really not ingesting it currently.   Stopped due to feeling firmer stools with gluten and some indigestion ass. With it.             Diarrhea  Onset/Duration: Colitis flare up since somewhere between Thanksving and Cb and has continued since  Description:       Consistency of stool: watery, runny, and explosive       Blood in stool: YES- Every time he usually notices blood present. Possibly only a handful of times blood has not been present       Number of loose stools past 24 hours: 5  Progression of Symptoms: same  Accompanying signs and symptoms:       Fever: No       Nausea/Vomiting: YES- Nausea occasionally       Abdominal pain: No       Weight loss: YES       Episodes of constipation: No  History   Ill contacts: No  Recent use of antibiotics: No  Recent travels: No  Recent medication-new or changes(Rx or OTC): No  Precipitating or alleviating factors: None  Therapies tried and outcome: None    Feeling a bit weak this past 1-2 weeks, but keeping up with normal  "activities, work schedule.     Bleeding is mild, only with stools, bright red.         No infection source suspected.        Review of Systems  Constitutional, HEENT, cardiovascular, pulmonary, gi and gu systems are negative, except as otherwise noted.      Objective    /80 (BP Location: Left arm, Patient Position: Sitting, Cuff Size: Adult Regular)   Pulse 91   Temp 96.9  F (36.1  C) (Temporal)   Resp 14   Ht 1.748 m (5' 8.8\")   Wt 102.6 kg (226 lb 1.6 oz)   SpO2 97%   BMI 33.58 kg/m    Body mass index is 33.58 kg/m .  Physical Exam   GENERAL: alert and no distress  EYES: Eyes grossly normal to inspection, PERRL and conjunctivae and sclerae normal  HENT: normal cephalic/atraumatic, ear canals and TM's normal, nose and mouth without ulcers or lesions, oropharynx clear, and oral mucous membranes moist  NECK: no adenopathy, no asymmetry, masses, or scars  RESP: lungs clear to auscultation - no rales, rhonchi or wheezes  CV: regular rate and rhythm, normal S1 S2, no S3 or S4, no murmur, click or rub, no peripheral edema  ABDOMEN: soft, nontender, no hepatosplenomegaly, no masses and bowel sounds normal  MS: no gross musculoskeletal defects noted, no edema  SKIN: no suspicious lesions or rashes  NEURO: Normal strength and tone, mentation intact and speech normal  PSYCH: mentation appears normal, affect normal/bright    Results for orders placed or performed in visit on 02/10/25   Comprehensive metabolic panel     Status: Normal   Result Value Ref Range    Sodium 137 135 - 145 mmol/L    Potassium 4.5 3.4 - 5.3 mmol/L    Carbon Dioxide (CO2) 26 22 - 29 mmol/L    Anion Gap 11 7 - 15 mmol/L    Urea Nitrogen 10.9 6.0 - 20.0 mg/dL    Creatinine 0.88 0.67 - 1.17 mg/dL    GFR Estimate >90 >60 mL/min/1.73m2    Calcium 9.1 8.8 - 10.4 mg/dL    Chloride 100 98 - 107 mmol/L    Glucose 97 70 - 99 mg/dL    Alkaline Phosphatase 60 40 - 150 U/L    AST 40 0 - 45 U/L    ALT 37 0 - 70 U/L    Protein Total 7.9 6.4 - 8.3 g/dL    " Albumin 3.8 3.5 - 5.2 g/dL    Bilirubin Total 0.3 <=1.2 mg/dL   TSH with free T4 reflex     Status: Normal   Result Value Ref Range    TSH 1.30 0.30 - 4.20 uIU/mL   Uric acid     Status: Normal   Result Value Ref Range    Uric Acid 5.9 3.4 - 7.0 mg/dL   CRP, inflammation     Status: Abnormal   Result Value Ref Range    CRP Inflammation 28.20 (H) <5.00 mg/L   ESR: Erythrocyte sedimentation rate     Status: Abnormal   Result Value Ref Range    Erythrocyte Sedimentation Rate 39 (H) 0 - 15 mm/hr   Anti Nuclear Hortencia IgG by IFA with Reflex     Status: Normal   Result Value Ref Range    DENISE interpretation Negative Negative   CBC with Platelets and Reflex to Iron Studies     Status: Abnormal   Result Value Ref Range    WBC Count 8.6 4.0 - 11.0 10e3/uL    RBC Count 4.56 4.40 - 5.90 10e6/uL    Hemoglobin 13.1 (L) 13.3 - 17.7 g/dL    Hematocrit 40.0 40.0 - 53.0 %    MCV 88 78 - 100 fL    MCH 28.7 26.5 - 33.0 pg    MCHC 32.8 31.5 - 36.5 g/dL    RDW 12.4 10.0 - 15.0 %    Platelet Count 330 150 - 450 10e3/uL   Extra Green Top (Lithium Heparin) Tube     Status: None   Result Value Ref Range    Hold Specimen [facility]    CBC with Platelets and Reflex to Iron Studies     Status: Abnormal    Narrative    The following orders were created for panel order CBC with Platelets and Reflex to Iron Studies.  Procedure                               Abnormality         Status                     ---------                               -----------         ------                     CBC with Platelets and R...[[id number]]  Abnormal            Final result               Extra Green Top (Lithium...[[id number]]                      Final result                 Please view results for these tests on the individual orders.           Signed Electronically by: TAMIKA Sarkar CNP

## 2025-02-11 PROCEDURE — 87493 C DIFF AMPLIFIED PROBE: CPT | Performed by: NURSE PRACTITIONER

## 2025-02-11 PROCEDURE — 83630 LACTOFERRIN FECAL (QUAL): CPT | Performed by: NURSE PRACTITIONER

## 2025-02-11 PROCEDURE — 87507 IADNA-DNA/RNA PROBE TQ 12-25: CPT | Mod: 59 | Performed by: NURSE PRACTITIONER

## 2025-02-12 LAB
ADV 40+41 DNA STL QL NAA+NON-PROBE: NEGATIVE
ANA SER QL IF: NEGATIVE
ASTRO TYP 1-8 RNA STL QL NAA+NON-PROBE: NEGATIVE
C CAYETANENSIS DNA STL QL NAA+NON-PROBE: NEGATIVE
C DIFF TOX B STL QL: NEGATIVE
CAMPYLOBACTER DNA SPEC NAA+PROBE: NEGATIVE
CRYPTOSP DNA STL QL NAA+NON-PROBE: NEGATIVE
E COLI O157 DNA STL QL NAA+NON-PROBE: NORMAL
E HISTOLYT DNA STL QL NAA+NON-PROBE: NEGATIVE
EAEC ASTA GENE ISLT QL NAA+PROBE: NEGATIVE
EC STX1+STX2 GENES STL QL NAA+NON-PROBE: NEGATIVE
EPEC EAE GENE STL QL NAA+NON-PROBE: NEGATIVE
ETEC LTA+ST1A+ST1B TOX ST NAA+NON-PROBE: NEGATIVE
G LAMBLIA DNA STL QL NAA+NON-PROBE: NEGATIVE
LACTOFERRIN STL QL IA: POSITIVE
NOROVIRUS GI+II RNA STL QL NAA+NON-PROBE: NEGATIVE
P SHIGELLOIDES DNA STL QL NAA+NON-PROBE: NEGATIVE
RVA RNA STL QL NAA+NON-PROBE: NEGATIVE
SALMONELLA SP RPOD STL QL NAA+PROBE: NEGATIVE
SAPO I+II+IV+V RNA STL QL NAA+NON-PROBE: NEGATIVE
SHIGELLA SP+EIEC IPAH ST NAA+NON-PROBE: NEGATIVE
V CHOLERAE DNA SPEC QL NAA+PROBE: NEGATIVE
VIBRIO DNA SPEC NAA+PROBE: NEGATIVE
Y ENTEROCOL DNA STL QL NAA+PROBE: NEGATIVE

## 2025-02-12 PROCEDURE — 87338 HPYLORI STOOL AG IA: CPT | Performed by: NURSE PRACTITIONER

## 2025-02-12 RX ORDER — BUDESONIDE 3 MG/1
9 CAPSULE, COATED PELLETS ORAL EVERY MORNING
Qty: 90 CAPSULE | Refills: 0 | Status: SHIPPED | OUTPATIENT
Start: 2025-02-12

## 2025-02-12 NOTE — RESULT ENCOUNTER NOTE
Noted, consistent with colitis flare, currently. More labs processing.      Sincerely,   Modesta Vernon, DNP

## 2025-02-12 NOTE — RESULT ENCOUNTER NOTE
Inflammation noted, no auto-immune finding on blood work. Mild anemia. Has appt. With GI upcoming.   Sincerely,   Modesta Vernon DNP

## 2025-02-13 LAB — H PYLORI AG STL QL IA: NEGATIVE

## 2025-02-19 ENCOUNTER — TELEPHONE (OUTPATIENT)
Dept: GASTROENTEROLOGY | Facility: CLINIC | Age: 50
End: 2025-02-19
Payer: COMMERCIAL

## 2025-02-19 NOTE — TELEPHONE ENCOUNTER
"Endoscopy Scheduling Screen    Have you had any respiratory illness or flu-like symptoms in the last 10 days?  No    What is your communication preference for Instructions and/or Bowel Prep?   MyChart    What insurance is in the chart?  Other:  [employer]    Ordering/Referring Provider: Saulo   (If ordering provider performs procedure, schedule with ordering provider unless otherwise instructed. )    BMI: Estimated body mass index is 33.58 kg/m  as calculated from the following:    Height as of 2/10/25: 1.748 m (5' 8.8\").    Weight as of 2/10/25: 102.6 kg (226 lb 1.6 oz).     Sedation Ordered  moderate sedation.   If patient BMI > 50 do not schedule in [facility].    If patient BMI > 45 do not schedule at [facility].    Are you taking methadone or Suboxone?  NO, No RN review required.    Have you been diagnosed and are being treated for severe PTSD or severe anxiety?  NO, No RN review required.    Are you taking any prescription medications for pain 3 or more times per week?   NO, No RN review required.    Do you have a history of malignant hyperthermia?  No    (Females) Are you currently pregnant?        Have you been diagnosed or told you have pulmonary hypertension?   No    Do you have an LVAD?  No    Have you been told you have moderate to severe sleep apnea?  No.    Have you been told you have COPD, asthma, or any other lung disease?  No    Do you  have a history of any heart conditions or any upcoming cardiac exams like an echo, angiogram, stress test, or ablation?  No     Have you ever had or are you waiting for an organ transplant?  No. Continue scheduling, no site restrictions.    Have you had a stroke or transient ischemic attack (TIA aka \"mini stroke\") in the last 2 years?   No.    Have you been diagnosed with or been told you have cirrhosis of the liver?   No.    Are you currently on dialysis?   No    Do you need assistance transferring?   No    BMI: Estimated body mass index is 33.58 kg/m  as calculated from the " "following:    Height as of 2/10/25: 1.748 m (5' 8.8\").    Weight as of 2/10/25: 102.6 kg (226 lb 1.6 oz).     Is patients BMI > 40 and scheduling location [facility]?  No    Do you take an injectable or oral medication for weight loss or diabetes (excluding insulin)?  No    Do you take the medication Naltrexone?  No    Do you take blood thinners?  No       Prep   Are you currently on dialysis or do you have chronic kidney disease?  No    Do you have a diagnosis of diabetes?  No    Do you have a diagnosis of cystic fibrosis (CF)?  No    On a regular basis do you go 3 -5 days between bowel movements?  No    BMI > 40?  No    Preferred Pharmacy:    [id number] - [place], [place] - [address]  [address]  [place] [address]  Phone: [phone] Fax: [phone]      Final Scheduling Details     Procedure scheduled  Colonoscopy    Surgeon:  Leela-IBD provider per RN triage on order     Date of procedure:  3/21/25     Pre-OP / PAC:   No - Not required for this site.    Location  [facility] - [facility] - Patient preference.    Sedation   Moderate Sedation - Per order.      Patient Reminders:   You will receive a call from a Nurse to review instructions and health history.  This assessment must be completed prior to your procedure.  Failure to complete the Nurse assessment may result in the procedure being cancelled.      On the day of your procedure, please designate an adult(s) who can drive you home stay with you for the next 24 hours. The medicines used in the exam will make you sleepy. You will not be able to drive.      You cannot take public transportation, ride share services, or non-medical taxi service without a responsible caregiver.  Medical transport services are allowed with the requirement that a responsible caregiver will receive you at your destination.  We require that drivers and caregivers are confirmed prior to your procedure.   "

## 2025-03-10 ENCOUNTER — DOCUMENTATION ONLY (OUTPATIENT)
Dept: GASTROENTEROLOGY | Facility: CLINIC | Age: 50
End: 2025-03-10
Payer: COMMERCIAL

## 2025-03-18 ENCOUNTER — VIRTUAL VISIT (OUTPATIENT)
Dept: GASTROENTEROLOGY | Facility: CLINIC | Age: 50
End: 2025-03-18
Payer: COMMERCIAL

## 2025-03-18 DIAGNOSIS — K51.211 ULCERATIVE PROCTITIS WITH RECTAL BLEEDING (H): Primary | ICD-10-CM

## 2025-03-18 DIAGNOSIS — K52.9 COLITIS: ICD-10-CM

## 2025-03-18 PROCEDURE — 98003 SYNCH AUDIO-VIDEO NEW HI 60: CPT | Performed by: INTERNAL MEDICINE

## 2025-03-18 PROCEDURE — 1125F AMNT PAIN NOTED PAIN PRSNT: CPT | Mod: 95 | Performed by: INTERNAL MEDICINE

## 2025-03-18 RX ORDER — MESALAMINE 1000 MG/1
1000 SUPPOSITORY RECTAL AT BEDTIME
Qty: 30 SUPPOSITORY | Refills: 11 | Status: SHIPPED | OUTPATIENT
Start: 2025-03-18 | End: 2026-03-18

## 2025-03-18 NOTE — PATIENT INSTRUCTIONS
It was a pleasure meeting with you today and discussing your healthcare plan. Below is a summary of what we covered:    It was nice to talk with you today.  I have sent a prescription for the mesalamine suppositories to your pharmacy.  Please use 1 nightly prior to bedtime.  I have also placed the order for the blood work which you can get next week and a baseline stool test which you can get sometime after that.  Please proceed with the colonoscopy as scheduled later this week.  If there is inflammation higher up in the colon we may consider the addition of some pills by mouth.  Please let us know if you continue to have significant GI symptoms moving forward.  We will schedule you a follow-up clinic visit to check in in about 3 months.  If there is continued inflammation which does not respond well to the mesalamine medication, then we can discuss further options for therapy in greater detail.  Please let me know if you have any questions or concerns.      Please see below for any additional questions and scheduling guidelines.    Sign up for [employer]: [employer] patient portal serves as a secure platform for accessing your medical records from the [facility]. Additionally, [employer] facilitates easy, timely, and secure messaging with your care team. If you have not signed up, you may do so by using the provided code or calling [phone].    Coordinating your care after your visit:  There are multiple options for scheduling your follow-up care based on your provider's recommendation.    How do I schedule a follow-up clinic appointment:   After your appointment, you may receive scheduling assistance with the Clinic Coordinators by having a seat in the waiting room and a Clinic Coordinator will call you up to schedule.  Virtual visits or after you leave the clinic:  Your provider has placed a follow-up order in the [employer] portal for scheduling your return appointment. A member of the scheduling team will  contact you to schedule.  [facility] Sched[facility]: Timely scheduling through [email or web address] is advised to ensure appointment availability.   Call to schedule: You may schedule your follow-up appointment(s) by calling [phone], option 1.    How do I schedule my endoscopy or colonoscopy procedure:  If a procedure, such as a colonoscopy or upper endoscopy was ordered by your provider, the scheduling team will contact you to schedule this procedure. Or you may choose to call to schedule at   [phone], option 2.  Please allow 20-30 minutes when scheduling a procedure.    How do I get my blood work done? To get your blood work done, you need to schedule a lab appointment at an [facility]. There are multiple ways to schedule:   At the clinic: The Clinic Coordinator you meet after your visit can help you schedule a lab appointment.   [email or web address] scheduling: [email or web address] offers online lab scheduling at all [facility] laboratory locations.   Call to schedule: You can call [phone] to schedule your lab appointment.    How do I schedule my imaging study: To schedule imaging studies, such as CT scans, ultrasounds, MRIs, or X-rays, contact Imaging Services at [phone].    How do I schedule a referral to another doctor: If your provider recommended a referral to another specialist(s), the referral order was placed by your provider. You will receive a phone call to schedule this referral, or you may choose to call the number attached to the referral to self-schedule.    For Post-Visit Question(s):  For any inquiries following today's visit:  Please utilize [email or web address] messaging and allow 48 hours for reply or contact the Call Center during normal business hours at [phone], option 3.  For Emergent After-hours questions, contact the On-Call GI Fellow through the [facility] at [phone].  In addition, you may contact your Nurse directly using the provided contact information.    Test  Results:  Test results will be accessible via [employer] in compliance with the 21st Century Cures Act. This means that your results will be available to you at the same time as your provider. Often you may see your results before your provider does. Results are reviewed by staff within two weeks with communication follow-up. Results may be released in the patient portal prior to your care team review.    Prescription Refill(s):  Medication prescribed by your provider will be addressed during your visit. For future refills, please coordinate with your pharmacy. If you have not had a recent clinic visit or routine labs, for your safety, your provider may not be able to refill your prescription.

## 2025-03-18 NOTE — PROGRESS NOTES
"Virtual Visit Details    Type of service:  Video Visit     Originating Location (pt. Location): {video visit patient location:713754::\"Home\"}  {PROVIDER LOCATION On-site should be selected for visits conducted from your clinic location or adjoining [facility] hospital, academic office, or other nearby [facility] building. Off-site should be selected for all other provider locations, including home:056547}  Distant Location (provider location):  {virtual location provider:662241}  Platform used for Video Visit: {Virtual Visit Platforms:136587::\"ScreenMedix\"}    "

## 2025-03-18 NOTE — PROGRESS NOTES
GASTROENTEROLOGY NEW PATIENT VIDEO VISIT    CC/REFERRING MD:    Amrita Garcia    REASON FOR CONSULTATION:   Modesta Vernon for   Chief Complaint   Patient presents with    Consult       HISTORY OF PRESENT ILLNESS:    Pavel Perez is a 49 year old male who is being evaluated via a billable video visit.      We evaluated this patient today for symptoms of diarrhea and rectal bleeding.  He has a known history of colitis in the past diagnosed by colonoscopy.  He notes that he developed increased stool frequency, loose stools, blood in the stool around November 2024.  He did not have abdominal pain but he did have some subjective weight loss.  Stool frequency was 6-7 times per day, blood with all bowel movements either in the stool or with wiping.  He saw his primary care and was prescribed budesonide 9 mg/day which improved symptoms.  He took 1 month of therapy, recently ended.  Bowel movement frequency decreased to about 4 or 5 stools per day, still with blood in the stool.  There are no nocturnal stools.  Weight is now stable.  He did have gout during this period of time around December 2024.  He took some ibuprofen around that time, he has subsequently stopped ibuprofen and now takes a supplement called UAX pro.  He originally was diagnosed with inflammation of the colon in 2012.  He had a colonoscopy noting rectal inflammation, biopsies were consistent with inflammatory bowel disease.  He reports that he was given a suppository at 1 point in the past which improved his symptoms.  He has had on and off blood in the stool over the years but has not been on any consistent therapy and has not seen a GI doctor in the past.  He notes that his health is otherwise fairly good.  There is no family history of colorectal cancer or inflammatory bowel disease.    Disease/Date of Dx:   2012  Extent/Location:   ulcerative proctitis  Last Colonoscopy: planned March 2025  Last MRE/CTE: none  CRP level: 28.2  (though with proctitis flare and concurrent gout flare)   Calprotectin level: pend  Extraintestinal Manifestations:  none  Past Surgery:   none    Past Medications:  budesonide-completed therapy  Current Medications:  mesalamine suppository nightly  Drug Levels:  Biologics: none  Prior TPMPT (none), prior thiopurine level (none)  History of tobacco:  none  History of c-diff:  no    Cancer Screening:  Next dysplasia screening is recommended:  planned March 2025.    Recommend all IBD patients on biologic therapy meet with our medical therapy management team to review health maintenance needs at least yearly.      .    PERTINENT STUDIES have been reviewed.    ASSESSMENT/PLAN:    Pavel Perez is a 49 year old male who presents for evaluation of inflammatory bowel disease.  His history is consistent with ulcerative proctitis diagnosed in 2012, relatively benign course over the years with intermittent rectal bleeding.  He now has a flare of his proctitis as noted above with partial response to budesonide therapy.  He has elevated CRP but this was also checked in the setting of active gout attack.  Given he remains symptomatic and has classic ulcerative proctitis by prior colonoscopy, I think it would be reasonable to start mesalamine suppositories at this time.  He has a colonoscopy scheduled later this week which will be helpful to assess for extent of disease activity.  Should there be significant inflammation beyond the rectum, we may consider the addition of oral mesalamine or change to mesalamine enemas.  I would also like him to get updated blood work to repeat CRP level, and get some prebiologic testing performed.  I would also like to do another stool test for a calprotectin level.  We will have him follow-up in about 3 months.  If he continues to have significant symptoms or objective evidence of inflammation despite mesalamine based therapy, then biologic or small molecule options could be  considered.      Video-Visit Details    Type of service:  Video Visit    Video Start:  8:00am  Video Endo: 8:24am  Total Video Time: 23m 58s     Originating Location (pt. Location): Home    Distant Location (provider location):  Off Site     Mode of Communication:  Video Conference via [employer]    Dex Dawson MD    RTC 3 months with PRESTON, 1 year with me    Thank you for this consultation.  It was a pleasure to participate in the care of this patient; please contact us with any further questions.  A total of at least 60 minutes was spent with reviewing the chart, discussing with the patient, documentation and coordination of care on the day of this encounter.  The longitudinal plan of care for the diagnosis(es)/condition(s) as documented were addressed during this visit. Due to the added complexity in care, I will continue to support Pavel in the subsequent management and with ongoing continuity of care.    This note was created with voice recognition software, and while reviewed for accuracy, typos may remain.     Dex Dawson MD  Division of Gastroenterology, Hepatology and Nutrition  [facility]  [phone]

## 2025-03-18 NOTE — NURSING NOTE
Current patient location: [id number] [place] [address]    Is the patient currently in the state of [place]? YES    Visit mode: VIDEO    If the visit is dropped, the patient can be reconnected by:VIDEO VISIT: Text to cell phone:   Telephone Information:   Mobile [phone]       Will anyone else be joining the visit? NO  (If patient encounters technical issues they should call [phone] :[id number])    Are changes needed to the allergy or medication list? No    Are refills needed on medications prescribed by this physician? NO    Rooming Documentation:  Questionnaire(s) completed    Reason for visit: Consult    Christa SIEGEL

## 2025-03-19 ENCOUNTER — TELEPHONE (OUTPATIENT)
Dept: GASTROENTEROLOGY | Facility: CLINIC | Age: 50
End: 2025-03-19
Payer: COMMERCIAL

## 2025-03-21 ENCOUNTER — HOSPITAL ENCOUNTER (OUTPATIENT)
Facility: AMBULATORY SURGERY CENTER | Age: 50
Discharge: HOME | End: 2025-03-21
Attending: INTERNAL MEDICINE
Payer: COMMERCIAL

## 2025-03-21 VITALS
TEMPERATURE: 97.2 F | OXYGEN SATURATION: 99 % | SYSTOLIC BLOOD PRESSURE: 130 MMHG | RESPIRATION RATE: 16 BRPM | HEART RATE: 80 BPM | DIASTOLIC BLOOD PRESSURE: 94 MMHG

## 2025-03-21 DIAGNOSIS — K51.011 ULCERATIVE PANCOLITIS WITH RECTAL BLEEDING (H): Primary | ICD-10-CM

## 2025-03-21 LAB — COLONOSCOPY: NORMAL

## 2025-03-21 PROCEDURE — G8918 PT W/O PREOP ORDER IV AB PRO: HCPCS

## 2025-03-21 PROCEDURE — 45380 COLONOSCOPY AND BIOPSY: CPT

## 2025-03-21 PROCEDURE — G8907 PT DOC NO EVENTS ON DISCHARG: HCPCS

## 2025-03-21 RX ORDER — ONDANSETRON 2 MG/ML
4 INJECTION INTRAMUSCULAR; INTRAVENOUS
Status: DISCONTINUED | OUTPATIENT
Start: 2025-03-21 | End: 2025-03-22 | Stop reason: HOSPADM

## 2025-03-21 RX ORDER — NALOXONE HYDROCHLORIDE 0.4 MG/ML
0.4 INJECTION, SOLUTION INTRAMUSCULAR; INTRAVENOUS; SUBCUTANEOUS
Status: DISCONTINUED | OUTPATIENT
Start: 2025-03-21 | End: 2025-03-22 | Stop reason: HOSPADM

## 2025-03-21 RX ORDER — MESALAMINE 1.2 G/1
4600 TABLET, DELAYED RELEASE ORAL
Qty: 120 TABLET | Refills: 2 | Status: SHIPPED | OUTPATIENT
Start: 2025-03-21

## 2025-03-21 RX ORDER — ONDANSETRON 4 MG/1
4 TABLET, ORALLY DISINTEGRATING ORAL EVERY 6 HOURS PRN
Status: DISCONTINUED | OUTPATIENT
Start: 2025-03-21 | End: 2025-03-22 | Stop reason: HOSPADM

## 2025-03-21 RX ORDER — NALOXONE HYDROCHLORIDE 0.4 MG/ML
0.2 INJECTION, SOLUTION INTRAMUSCULAR; INTRAVENOUS; SUBCUTANEOUS
Status: DISCONTINUED | OUTPATIENT
Start: 2025-03-21 | End: 2025-03-22 | Stop reason: HOSPADM

## 2025-03-21 RX ORDER — LIDOCAINE 40 MG/G
CREAM TOPICAL
Status: DISCONTINUED | OUTPATIENT
Start: 2025-03-21 | End: 2025-03-22 | Stop reason: HOSPADM

## 2025-03-21 RX ORDER — PROCHLORPERAZINE MALEATE 10 MG
10 TABLET ORAL EVERY 6 HOURS PRN
Status: DISCONTINUED | OUTPATIENT
Start: 2025-03-21 | End: 2025-03-22 | Stop reason: HOSPADM

## 2025-03-21 RX ORDER — FLUMAZENIL 0.1 MG/ML
0.2 INJECTION, SOLUTION INTRAVENOUS
Status: ACTIVE | OUTPATIENT
Start: 2025-03-21 | End: 2025-03-21

## 2025-03-21 RX ORDER — ONDANSETRON 2 MG/ML
4 INJECTION INTRAMUSCULAR; INTRAVENOUS EVERY 6 HOURS PRN
Status: DISCONTINUED | OUTPATIENT
Start: 2025-03-21 | End: 2025-03-22 | Stop reason: HOSPADM

## 2025-03-21 NOTE — H&P
[facility] Anesthesia Pre-op History and Physical    Pavel Perez MRN# [id number]   Age: 49 year old YOB: 1975            Date of Exam 3/21/2025         Primary care provider: Amrita Garcia         Chief Complaint and/or Reason for Procedure:     History of ulcerative proctitis - off therapy for some time with recent flare of symptoms. Last c-scope 2012         Active problem list:     Patient Active Problem List    Diagnosis Date Noted    Obesity (BMI 35.0-39.9) with comorbidity (H) 09/11/2019     Priority: Medium    Arthritis 09/17/2015     Priority: Medium     Suspect gout      Ulcerative (chronic) proctitis (H) 01/13/2012     Priority: Medium    CARDIOVASCULAR SCREENING; LDL GOAL LESS THAN 160 10/31/2010     Priority: Medium    Atopic dermatitis 04/29/2009     Priority: Medium            Medications (include herbals and vitamins):   Any Plavix use in the last 7 days? No     Current Outpatient Medications   Medication Sig Dispense Refill    Ascorbic Acid (VITAMIN C PO) Take by mouth as needed.      Cholecalciferol (VITAMIN D PO) Take by mouth as needed.      CLARITIN-D 24 HOUR  MG per tablet TAKE 1 TABLET BY MOUTH EVERY DAY (Patient taking differently: daily as needed.) 90 tablet 0    mesalamine (CANASA) 1000 MG suppository Place 1 suppository (1,000 mg) rectally at bedtime. 30 suppository 11    Multiple Vitamins-Minerals (MULTIVITAMIN PO) Take by mouth as needed.      naphazoline (NAPHCON) 0.012 % ophthalmic solution 1 drop 4 times daily as needed.      Ibuprofen (ADVIL PO) Take by mouth every 6 hours as needed.      Omega-3 Fatty Acids (FISH OIL PO)       Pseudoephedrine-guaiFENesin (MUCINEX D PO) Take by mouth as needed.      spacer (OPTICHAMBER SUZANNE) holding chamber To use with albuterol inhaler 1 each 0    TYLENOL 325 MG OR TABS Take by mouth every 6 hours as needed.       Current Facility-Administered Medications   Medication Dose Route Frequency Provider Last Rate Last  Admin    lidocaine (LMX4) kit   Topical Q1H PRN Hailee Swenson DO        lidocaine 1 % 0.1-1 mL  0.1-1 mL Other Q1H PRN Hailee Swenson DO        ondansetron (ZOFRAN) injection 4 mg  4 mg Intravenous Once PRN Hailee Swenson DO        sodium chloride (PF) 0.9% PF flush 3 mL  3 mL Intracatheter Q8H Hailee Swenson DO        sodium chloride (PF) 0.9% PF flush 3 mL  3 mL Intracatheter q1 min prn Hailee Swenson DO                 Allergies:    No Known Allergies  Allergy to Latex? No  Allergy to tape?   No  Intolerances:             Physical Exam:   All vitals have been reviewed  Patient Vitals for the past 8 hrs:   BP Temp Temp src Pulse Resp SpO2   03/21/25 0831 130/88 97.5  F (36.4  C) Temporal 80 16 98 %     No intake/output data recorded.  Lungs:   no increased work of breathing     Cardiovascular:   RRR             Lab / Radiology Results:            Anesthetic risk and/or ASA classification:   2    Hailee Swenson DO

## 2025-03-24 ENCOUNTER — LAB (OUTPATIENT)
Dept: LAB | Facility: CLINIC | Age: 50
End: 2025-03-24
Payer: COMMERCIAL

## 2025-03-24 DIAGNOSIS — Z13.220 SCREENING FOR LIPOID DISORDERS: ICD-10-CM

## 2025-03-24 DIAGNOSIS — K51.211 ULCERATIVE PROCTITIS WITH RECTAL BLEEDING (H): ICD-10-CM

## 2025-03-24 LAB
ALBUMIN SERPL BCG-MCNC: 4.2 G/DL (ref 3.5–5.2)
ALP SERPL-CCNC: 62 U/L (ref 40–150)
ALT SERPL W P-5'-P-CCNC: 17 U/L (ref 0–70)
ANION GAP SERPL CALCULATED.3IONS-SCNC: 8 MMOL/L (ref 7–15)
AST SERPL W P-5'-P-CCNC: 21 U/L (ref 0–45)
BASOPHILS # BLD AUTO: 0 10E3/UL (ref 0–0.2)
BASOPHILS NFR BLD AUTO: 1 %
BILIRUB SERPL-MCNC: 0.5 MG/DL
BUN SERPL-MCNC: 12.5 MG/DL (ref 6–20)
CALCIUM SERPL-MCNC: 9.5 MG/DL (ref 8.8–10.4)
CHLORIDE SERPL-SCNC: 102 MMOL/L (ref 98–107)
CHOLEST SERPL-MCNC: 197 MG/DL
CREAT SERPL-MCNC: 0.89 MG/DL (ref 0.67–1.17)
CRP SERPL-MCNC: 7.92 MG/L
EGFRCR SERPLBLD CKD-EPI 2021: >90 ML/MIN/1.73M2
EOSINOPHIL # BLD AUTO: 0.2 10E3/UL (ref 0–0.7)
EOSINOPHIL NFR BLD AUTO: 3 %
ERYTHROCYTE [DISTWIDTH] IN BLOOD BY AUTOMATED COUNT: 13.7 % (ref 10–15)
FASTING STATUS PATIENT QL REPORTED: YES
FASTING STATUS PATIENT QL REPORTED: YES
GLUCOSE SERPL-MCNC: 95 MG/DL (ref 70–99)
HBV SURFACE AB SERPL IA-ACNC: <3.5 M[IU]/ML
HBV SURFACE AB SERPL IA-ACNC: NONREACTIVE M[IU]/ML
HCO3 SERPL-SCNC: 28 MMOL/L (ref 22–29)
HCT VFR BLD AUTO: 42.5 % (ref 40–53)
HCV AB SERPL QL IA: NONREACTIVE
HDLC SERPL-MCNC: 55 MG/DL
HGB BLD-MCNC: 14.2 G/DL (ref 13.3–17.7)
IMM GRANULOCYTES # BLD: 0.1 10E3/UL
IMM GRANULOCYTES NFR BLD: 1 %
LDLC SERPL CALC-MCNC: 126 MG/DL
LYMPHOCYTES # BLD AUTO: 1.5 10E3/UL (ref 0.8–5.3)
LYMPHOCYTES NFR BLD AUTO: 24 %
MCH RBC QN AUTO: 29.2 PG (ref 26.5–33)
MCHC RBC AUTO-ENTMCNC: 33.4 G/DL (ref 31.5–36.5)
MCV RBC AUTO: 87 FL (ref 78–100)
MONOCYTES # BLD AUTO: 0.5 10E3/UL (ref 0–1.3)
MONOCYTES NFR BLD AUTO: 8 %
NEUTROPHILS # BLD AUTO: 4.1 10E3/UL (ref 1.6–8.3)
NEUTROPHILS NFR BLD AUTO: 64 %
NONHDLC SERPL-MCNC: 142 MG/DL
PATH REPORT.COMMENTS IMP SPEC: NORMAL
PATH REPORT.COMMENTS IMP SPEC: NORMAL
PATH REPORT.FINAL DX SPEC: NORMAL
PATH REPORT.GROSS SPEC: NORMAL
PATH REPORT.MICROSCOPIC SPEC OTHER STN: NORMAL
PATH REPORT.RELEVANT HX SPEC: NORMAL
PHOTO IMAGE: NORMAL
PLATELET # BLD AUTO: 217 10E3/UL (ref 150–450)
POTASSIUM SERPL-SCNC: 4.3 MMOL/L (ref 3.4–5.3)
PROT SERPL-MCNC: 7.7 G/DL (ref 6.4–8.3)
RBC # BLD AUTO: 4.86 10E6/UL (ref 4.4–5.9)
SODIUM SERPL-SCNC: 138 MMOL/L (ref 135–145)
TRIGL SERPL-MCNC: 82 MG/DL
WBC # BLD AUTO: 6.4 10E3/UL (ref 4–11)

## 2025-03-24 PROCEDURE — 86704 HEP B CORE ANTIBODY TOTAL: CPT

## 2025-03-24 PROCEDURE — 80053 COMPREHEN METABOLIC PANEL: CPT

## 2025-03-24 PROCEDURE — 80061 LIPID PANEL: CPT

## 2025-03-24 PROCEDURE — 86706 HEP B SURFACE ANTIBODY: CPT

## 2025-03-24 PROCEDURE — 86140 C-REACTIVE PROTEIN: CPT

## 2025-03-24 PROCEDURE — 87340 HEPATITIS B SURFACE AG IA: CPT

## 2025-03-24 PROCEDURE — 86481 TB AG RESPONSE T-CELL SUSP: CPT

## 2025-03-24 PROCEDURE — 36415 COLL VENOUS BLD VENIPUNCTURE: CPT

## 2025-03-24 PROCEDURE — 86803 HEPATITIS C AB TEST: CPT

## 2025-03-24 PROCEDURE — 85025 COMPLETE CBC W/AUTO DIFF WBC: CPT

## 2025-03-24 NOTE — RESULT ENCOUNTER NOTE
Jass Zhao, your cholesterol is looking to be in fairly good range!    Below is a tool that puts your heart/vascular risk factors together. It shows a possible risk of a heart attack or stroke event over a 10 year timeframe. The goal is under 7%. See your level below.       The 10-year ASCVD risk score (Juan Jose PINEDA, et al., 2019) is: 2.8%    Values used to calculate the score:      Age: 49 years      Sex: Male      Is Non- : No      Diabetic: No      Tobacco smoker: No      Systolic Blood Pressure: 130 mmHg      Is BP treated: No      HDL Cholesterol: 55 mg/dL      Total Cholesterol: 197 mg/dL     Continue to work on healthy habits.  More labs are processing.    Sincerely,   Modesta Vernon, DNP

## 2025-03-25 LAB
GAMMA INTERFERON BACKGROUND BLD IA-ACNC: 0.04 IU/ML
HBV CORE AB SERPL QL IA: NONREACTIVE
HBV SURFACE AG SERPL QL IA: NONREACTIVE
M TB IFN-G BLD-IMP: NEGATIVE
M TB IFN-G CD4+ BCKGRND COR BLD-ACNC: 9.96 IU/ML
MITOGEN IGNF BCKGRD COR BLD-ACNC: 0 IU/ML
MITOGEN IGNF BCKGRD COR BLD-ACNC: 0.02 IU/ML
QUANTIFERON MITOGEN: 10 IU/ML
QUANTIFERON NIL TUBE: 0.04 IU/ML
QUANTIFERON TB1 TUBE: 0.06 IU/ML
QUANTIFERON TB2 TUBE: 0.04

## 2025-05-03 SDOH — HEALTH STABILITY: PHYSICAL HEALTH: ON AVERAGE, HOW MANY MINUTES DO YOU ENGAGE IN EXERCISE AT THIS LEVEL?: 10 MIN

## 2025-05-03 SDOH — HEALTH STABILITY: PHYSICAL HEALTH: ON AVERAGE, HOW MANY DAYS PER WEEK DO YOU ENGAGE IN MODERATE TO STRENUOUS EXERCISE (LIKE A BRISK WALK)?: 1 DAY

## 2025-05-03 ASSESSMENT — SOCIAL DETERMINANTS OF HEALTH (SDOH): HOW OFTEN DO YOU GET TOGETHER WITH FRIENDS OR RELATIVES?: ONCE A WEEK

## 2025-05-06 ENCOUNTER — OFFICE VISIT (OUTPATIENT)
Dept: FAMILY MEDICINE | Facility: CLINIC | Age: 50
End: 2025-05-06
Payer: COMMERCIAL

## 2025-05-06 VITALS
BODY MASS INDEX: 35.35 KG/M2 | HEART RATE: 84 BPM | RESPIRATION RATE: 11 BRPM | SYSTOLIC BLOOD PRESSURE: 126 MMHG | OXYGEN SATURATION: 97 % | HEIGHT: 69 IN | WEIGHT: 238.7 LBS | DIASTOLIC BLOOD PRESSURE: 74 MMHG | TEMPERATURE: 98.1 F

## 2025-05-06 DIAGNOSIS — E66.812 CLASS 2 SEVERE OBESITY DUE TO EXCESS CALORIES WITH SERIOUS COMORBIDITY IN ADULT, UNSPECIFIED BMI (H): ICD-10-CM

## 2025-05-06 DIAGNOSIS — E66.01 CLASS 2 SEVERE OBESITY DUE TO EXCESS CALORIES WITH SERIOUS COMORBIDITY IN ADULT, UNSPECIFIED BMI (H): ICD-10-CM

## 2025-05-06 DIAGNOSIS — Z71.89 ADVANCED DIRECTIVES, COUNSELING/DISCUSSION: ICD-10-CM

## 2025-05-06 DIAGNOSIS — Z11.4 SCREENING FOR HIV (HUMAN IMMUNODEFICIENCY VIRUS): ICD-10-CM

## 2025-05-06 DIAGNOSIS — Z00.00 ROUTINE GENERAL MEDICAL EXAMINATION AT A HEALTH CARE FACILITY: Primary | ICD-10-CM

## 2025-05-06 PROCEDURE — 90472 IMMUNIZATION ADMIN EACH ADD: CPT | Performed by: NURSE PRACTITIONER

## 2025-05-06 PROCEDURE — 1126F AMNT PAIN NOTED NONE PRSNT: CPT | Performed by: NURSE PRACTITIONER

## 2025-05-06 PROCEDURE — 90715 TDAP VACCINE 7 YRS/> IM: CPT | Performed by: NURSE PRACTITIONER

## 2025-05-06 PROCEDURE — 3074F SYST BP LT 130 MM HG: CPT | Performed by: NURSE PRACTITIONER

## 2025-05-06 PROCEDURE — 90677 PCV20 VACCINE IM: CPT | Performed by: NURSE PRACTITIONER

## 2025-05-06 PROCEDURE — 99396 PREV VISIT EST AGE 40-64: CPT | Mod: 25 | Performed by: NURSE PRACTITIONER

## 2025-05-06 PROCEDURE — 3078F DIAST BP <80 MM HG: CPT | Performed by: NURSE PRACTITIONER

## 2025-05-06 PROCEDURE — 90471 IMMUNIZATION ADMIN: CPT | Performed by: NURSE PRACTITIONER

## 2025-05-06 ASSESSMENT — PAIN SCALES - GENERAL: PAINLEVEL_OUTOF10: NO PAIN (0)

## 2025-05-06 NOTE — PROGRESS NOTES
Preventive Care Visit  [facility] TAMIKA Fay CNP, Family Medicine  May 6, 2025      Assessment & Plan     Assessment & Plan  Routine general medical examination at a health care facility:    - General health appears stable. No immediate concerns noted.  - Administer tetanus and pneumonia vaccines. Schedule follow-up appointment next year.  - Risks and side effects: Potential for achy or flu-like symptoms post-vaccination.  - Discussed exercise routine changes due to job switch; plans to resume walking.  - Discussed fiber intake from raw fruits and vegetables; no issues noted.    Discussed options and rationale.  Ordered HCM testing per our discussion and patient decision based on USPSTF and Cancer screening guidelines.      Reinforced healthy habits with lifestyle, exercise and nutrition.    Patient may call for sleep evaluation if snoring is noted by his significant other.    Morbid Obesity: class II due to excessive calories with comorbidity.   Continuation of healthy habits.  Continue healthy nutrition, exercise.  Utilizing weight loss to improve overall physical and mental health, improved glycemic control, with cardiac benefits including improved blood pressures.    Screening for HIV (human immunodeficiency virus):  - No immediate need for HIV screening identified.  - Consider HIV screening at a future date, not urgent.    Advanced directives, counseling/discussion:  - No advanced directives currently in place.  - Provide information link for healthcare directive setup.    Continue specialty care for ulcerative colitis.    The longitudinal plan of care for the diagnosis(es)/condition(s) as documented were addressed during this visit. Due to the added complexity in care, I will continue to support the patient in the subsequent management and with ongoing continuity of care.    AI and/or dictation software was used for the creation of this note.    Patient has been advised of split  billing requirements and indicates understanding: Yes        Counseling  Appropriate preventive services were addressed with this patient via screening, questionnaire, or discussion as appropriate for fall prevention, nutrition, physical activity, Tobacco-use cessation, social engagement, weight loss and cognition.  Checklist reviewing preventive services available has been given to the patient.  Reviewed patient's diet, addressing concerns and/or questions.   He is at risk for lack of exercise and has been provided with information to increase physical activity for the benefit of his well-being.       Follow-up    Follow-up Visit   Expected date:  May 06, 2026 (Approximate)      Follow Up Appointment Details:     Follow-up with whom?: PCP    Follow-Up for what?: Adult Preventive    How?: In Person                 Subjective   Pavel is a 49 year old, presenting for the following:  Physical        5/6/2025     2:29 PM   Additional Questions   Roomed by AD   Accompanied by Self          HPI  History of Present Illness-  Pavel Perez, age: 49 years    Ulcerative Colitis  - Diagnosed with ulcerative colitis  - Started on mesalamine treatment  - Initially prescribed suppository, later switched to oral medication after colonoscopy  - Reduced dosage from 4 tablets to 3 tablets in the morning a week ago  - Improvement noted with medication, frequency of stools reduced to typically once a day    Misc  - Previous blood draws in February and March, with fasting during the second draw  - No history of shingles  - No issues with urination  - No problems with fiber intake from raw fruits and vegetables    Social History and Life Stressors  - Stress level is manageable  - Change in exercise routine due to job switch; plans to resume walking         Advance Care Planning    Discussed advance care planning with patient; informed AVS has link to [employer].        5/3/2025   General Health   How would you rate your overall  physical health? Good   Feel stress (tense, anxious, or unable to sleep) Only a little   (!) STRESS CONCERN      5/3/2025   Nutrition   Three or more servings of calcium each day? Yes   Diet: Gluten-free/reduced   How many servings of fruit and vegetables per day? (!) 2-3   How many sweetened beverages each day? 0-1         5/3/2025   Exercise   Days per week of moderate/strenous exercise 1 day   Average minutes spent exercising at this level 10 min   (!) EXERCISE CONCERN      5/3/2025   Social Factors   Frequency of gathering with friends or relatives Once a week   Worry food won't last until get money to buy more No   Food not last or not have enough money for food? No   Do you have housing? (Housing is defined as stable permanent housing and does not include staying outside in a car, in a tent, in an abandoned building, in an overnight shelter, or couch-surfing.) Yes   Are you worried about losing your housing? No   Lack of transportation? No   Unable to get utilities (heat,electricity)? No         5/3/2025   Dental   Dentist two times every year? Yes           Today's PHQ-2 Score:       2/10/2025     8:43 AM   PHQ-2 ( 1999 [employer])   Q1: Little interest or pleasure in doing things 1   Q2: Feeling down, depressed or hopeless 0   PHQ-2 Score 1    Q1: Little interest or pleasure in doing things Several days   Q2: Feeling down, depressed or hopeless Not at all   PHQ-2 Score 1       Patient-reported         5/3/2025   Substance Use   Alcohol more than 3/day or more than 7/wk Not Applicable   Do you use any other substances recreationally? No     Social History     Tobacco Use    Smoking status: Never    Smokeless tobacco: Never   Vaping Use    Vaping status: Never Used   Substance Use Topics    Alcohol use: Not Currently     Comment: occasionally    Drug use: No             5/3/2025   One time HIV Screening   Previous HIV test? I don't know         5/3/2025   STI Screening   New sexual partner(s) since last STI/HIV  "test? No   ASCVD Risk   The 10-year ASCVD risk score (Juan Jose PINEDA, et al., 2019) is: 2.7%    Values used to calculate the score:      Age: 49 years      Sex: Male      Is Non- : No      Diabetic: No      Tobacco smoker: No      Systolic Blood Pressure: 126 mmHg      Is BP treated: No      HDL Cholesterol: 55 mg/dL      Total Cholesterol: 197 mg/dL        5/3/2025   Contraception/Family Planning   Questions about contraception or family planning No        Reviewed and updated as needed this visit by Provider                    Labs reviewed in EPIC      Review of Systems  Constitutional, HEENT, cardiovascular, pulmonary, GI, , musculoskeletal, neuro, skin, endocrine and psych systems are negative, except as otherwise noted.     Objective    Exam  /74   Pulse 84   Temp 98.1  F (36.7  C) (Temporal)   Resp 11   Ht 1.753 m (5' 9\")   Wt 108.3 kg (238 lb 11.2 oz)   SpO2 97%   BMI 35.25 kg/m     Estimated body mass index is 35.25 kg/m  as calculated from the following:    Height as of this encounter: 1.753 m (5' 9\").    Weight as of this encounter: 108.3 kg (238 lb 11.2 oz).    Physical Exam  GENERAL: alert and no distress  EYES: Eyes grossly normal to inspection, PERRL and conjunctivae and sclerae normal  HENT: ear canals and TM's normal, nose and mouth without ulcers or lesions  NECK: no adenopathy, no asymmetry, masses, or scars  RESP: lungs clear to auscultation - no rales, rhonchi or wheezes  CV: regular rate and rhythm, normal S1 S2, no S3 or S4, no murmur, click or rub, no peripheral edema  ABDOMEN: soft, nontender, no hepatosplenomegaly, no masses and bowel sounds normal   (male): no hernias  MS: no gross musculoskeletal defects noted, no edema  SKIN: no suspicious lesions or rashes  NEURO: Normal strength and tone, mentation intact and speech normal  PSYCH: mentation appears normal, affect normal/bright        Signed Electronically by: TAMIKA Sarkar CNP    "

## 2025-05-06 NOTE — PATIENT INSTRUCTIONS
Patient Education   Preventive Care Advice   This is general advice given by our system to help you stay healthy. However, your care team may have specific advice just for you. Please talk to your care team about your preventive care needs.  Nutrition  Eat 5 or more servings of fruits and vegetables each day.  Try wheat bread, brown rice and whole grain pasta (instead of white bread, rice, and pasta).  Get enough calcium and vitamin D. Check the label on foods and aim for 100% of the RDA (recommended daily allowance).  Lifestyle  Exercise at least 150 minutes each week  (30 minutes a day, 5 days a week).  Do muscle strengthening activities 2 days a week. These help control your weight and prevent disease.  No smoking.  Wear sunscreen to prevent skin cancer.  Have a dental exam and cleaning every 6 months.  Yearly exams  See your health care team every year to talk about:  Any changes in your health.  Any medicines your care team has prescribed.  Preventive care, family planning, and ways to prevent chronic diseases.  Shots (vaccines)   HPV shots (up to age 26), if you've never had them before.  Hepatitis B shots (up to age 59), if you've never had them before.  COVID-19 shot: Get this shot when it's due.  Flu shot: Get a flu shot every year.  Tetanus shot: Get a tetanus shot every 10 years.  Pneumococcal, hepatitis A, and RSV shots: Ask your care team if you need these based on your risk.  Shingles shot (for age 50 and up)  General health tests  Diabetes screening:  Starting at age 35, Get screened for diabetes at least every 3 years.  If you are younger than age 35, ask your care team if you should be screened for diabetes.  Cholesterol test: At age 39, start having a cholesterol test every 5 years, or more often if advised.  Bone density scan (DEXA): At age 50, ask your care team if you should have this scan for osteoporosis (brittle bones).  Hepatitis C: Get tested at least once in your life.  STIs (sexually  transmitted infections)  Before age 24: Ask your care team if you should be screened for STIs.  After age 24: Get screened for STIs if you're at risk. You are at risk for STIs (including HIV) if:  You are sexually active with more than one person.  You don't use condoms every time.  You or a partner was diagnosed with a sexually transmitted infection.  If you are at risk for HIV, ask about PrEP medicine to prevent HIV.  Get tested for HIV at least once in your life, whether you are at risk for HIV or not.  Cancer screening tests  Cervical cancer screening: If you have a cervix, begin getting regular cervical cancer screening tests starting at age 21.  Breast cancer scan (mammogram): If you've ever had breasts, begin having regular mammograms starting at age 40. This is a scan to check for breast cancer.  Colon cancer screening: It is important to start screening for colon cancer at age 45.  Have a colonoscopy test every 10 years (or more often if you're at risk) Or, ask your provider about stool tests like a FIT test every year or Cologuard test every 3 years.  To learn more about your testing options, visit:   .  For help making a decision, visit:   https://bit.ly/pf26456.  Prostate cancer screening test: If you have a prostate, ask your care team if a prostate cancer screening test (PSA) at age 55 is right for you.  Lung cancer screening: If you are a current or former smoker ages 50 to 80, ask your care team if ongoing lung cancer screenings are right for you.  For informational purposes only. Not to replace the advice of your health care provider. Copyright   2023 [facility]. All rights reserved. Clinically reviewed by the [facility]. [employer] 184701 - REV 01/24.

## 2025-07-01 DIAGNOSIS — K51.011 ULCERATIVE PANCOLITIS WITH RECTAL BLEEDING (H): ICD-10-CM

## 2025-07-02 ENCOUNTER — RESULTS FOLLOW-UP (OUTPATIENT)
Dept: GASTROENTEROLOGY | Facility: CLINIC | Age: 50
End: 2025-07-02

## 2025-07-02 ENCOUNTER — OFFICE VISIT (OUTPATIENT)
Dept: GASTROENTEROLOGY | Facility: CLINIC | Age: 50
End: 2025-07-02
Attending: INTERNAL MEDICINE
Payer: COMMERCIAL

## 2025-07-02 VITALS
HEIGHT: 69 IN | DIASTOLIC BLOOD PRESSURE: 81 MMHG | OXYGEN SATURATION: 96 % | BODY MASS INDEX: 35.58 KG/M2 | SYSTOLIC BLOOD PRESSURE: 124 MMHG | WEIGHT: 240.2 LBS | HEART RATE: 86 BPM

## 2025-07-02 DIAGNOSIS — K51.511 LEFT SIDED ULCERATIVE COLITIS WITH RECTAL BLEEDING (H): Primary | ICD-10-CM

## 2025-07-02 LAB — CRP SERPL-MCNC: 4.32 MG/L

## 2025-07-02 PROCEDURE — 3079F DIAST BP 80-89 MM HG: CPT | Performed by: PHYSICIAN ASSISTANT

## 2025-07-02 PROCEDURE — 99204 OFFICE O/P NEW MOD 45 MIN: CPT | Performed by: PHYSICIAN ASSISTANT

## 2025-07-02 PROCEDURE — 3074F SYST BP LT 130 MM HG: CPT | Performed by: PHYSICIAN ASSISTANT

## 2025-07-02 PROCEDURE — 86140 C-REACTIVE PROTEIN: CPT | Performed by: PHYSICIAN ASSISTANT

## 2025-07-02 PROCEDURE — 1126F AMNT PAIN NOTED NONE PRSNT: CPT | Performed by: PHYSICIAN ASSISTANT

## 2025-07-02 PROCEDURE — 36415 COLL VENOUS BLD VENIPUNCTURE: CPT | Performed by: PHYSICIAN ASSISTANT

## 2025-07-02 RX ORDER — MESALAMINE 1.2 G/1
4600 TABLET, DELAYED RELEASE ORAL
Qty: 120 TABLET | Refills: 2 | OUTPATIENT
Start: 2025-07-02

## 2025-07-02 RX ORDER — MESALAMINE 1.2 G/1
4800 TABLET, DELAYED RELEASE ORAL
Qty: 360 TABLET | Refills: 1 | Status: SHIPPED | OUTPATIENT
Start: 2025-07-02

## 2025-07-02 ASSESSMENT — PAIN SCALES - GENERAL: PAINLEVEL_OUTOF10: NO PAIN (0)

## 2025-07-02 NOTE — PROGRESS NOTES
FOLLOW UP GI CLINIC VISIT    CC/REFERRING MD:  Dex Ingram*  REASON FOR CONSULTATION:   Dex Ingram* for   Chief Complaint   Patient presents with    Follow Up     Colitis follow up.       ASSESSMENT/PLAN: Pavel Perez is a 49 year old male that is seen for follow up in the GI clinic today for follow-up of left-sided ulcerative colitis.  The addition of oral mesalamine resulted in pretty good symptom improvement but self tapering led to some recurrence, so I encouraged patient to maintain 4.8 g daily going forward.  Plan to update CRP and fecal calprotectin and colonoscopy in 3 to 4 months.  Reviewed that if he has an exacerbation of symptoms, he should let us know immediately.  We could consider adding mesalamine suppositories back in, though would also consider updating colonoscopy sooner at that point.  Reviewed that he should avoid NSAIDs, Tylenol safe.  We talked about the natural course of ulcerative colitis being a chronic condition and the need to maintain remission over the long-term.  We briefly touched on biologic therapies as well, which may be needed to control this condition over the long-term depending on how he does with continued use of mesalamine.    1. Left sided ulcerative colitis with rectal bleeding (H) (Primary)  - mesalamine (LIALDA) 1.2 g DR tablet; Take 4 tablets (4,800 mg) by mouth daily (with breakfast).  Dispense: 360 tablet; Refill: 1  - CRP, inflammation; Future  - Calprotectin Feces; Future  - Adult GI  Referral - Procedure Only; Future  - CRP, inflammation  - Calprotectin Feces      RTC 6 months    Thank you for this consultation.  It was a pleasure to participate in the care of this patient; please contact us with any further questions.      28 minutes spent on the date of the encounter doing chart review, patient visit, and documentation    This note was created with voice recognition software, and while reviewed for accuracy, typos may remain.      Sergey Higgins PA-C  Division of Gastroenterology, Hepatology and Nutrition  [facility]  Pavel Perez is a 49 year old male that is seen for follow up in the GI clinic today for follow-up of ulcerative colitis.  This is my first time meeting patient.  He previously met with Dr. Hurst about 3 and half months ago.  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  Over the past several years, he had not been on any consistent therapy.  Due to increased stool frequency, loose stools, and blood in the stool this past fall, he was worked up further, ultimately undergoing colonoscopy in March of this year.  Shortly before that, he did meet with Dr. Dawson and was given mesalamine suppositories.  His colonoscopy revealed inflammation from the rectum to the descending colon and some patches of inflammation in the descending and transverse colon.  This was graded as Stahl score 2.  Biopsies consistent with chronic after colitis, Demetria grade 2.  Because of this, he was started on oral mesalamine 4 point grams daily.    He notes that initially, he had a pretty good response in terms of symptom resolution to the oral mesalamine.  He discontinued the mesalamine suppositories after about 2 weeks.  In May, he dropped down to 3.6 g mesalamine and continue to do all right, but then in June, dropped down to 2.4 g daily and had a recurrence of symptoms.  In the last couple of days, he has gone back up to 4.8 g again and stool is formed, no blood in the stool, no abdominal pain.  He has remained off of the mesalamine suppositories.    He did complete prebiologic labs with Dr. Dawson back in March, along with baseline labs of CBC, CMP, which were normal.  CRP was elevated at 7.2, though better from 28.2 in February, and fecal calprotectin was elevated at 758.    Disease/Date of Dx:   2012  Extent/Location:   Left-sided (patchy inflammation in  transverse colon)  Last Colonoscopy: Stahl 2 primarily left-sided but patchy through a transverse, Demetria grade 2 from descending colon to rectum  Last MRE/CTE: none  CRP level: 28.2 (though with proctitis flare and concurrent gout flare) -> 7.58 in March 2025  Calprotectin level: March 2025 - 758  Extraintestinal Manifestations:  none  Past Surgery:   none     Past Medications:  budesonide-completed therapy, mesalamine suppositories  Current Medications: 4.8 g mesalamine daily  Drug Levels:  Biologics: none  Prior TPMPT (none), prior thiopurine level (none)  History of tobacco:  none  History of c-diff:  no     Cancer Screening:  Next dysplasia screening is recommended: March 2027    ROS:    10 point ROS neg other than the symptoms noted above in the HPI.    ALLERGIES:   No Known Allergies    PERTINENT MEDICATIONS:    Current Outpatient Medications:     mesalamine (CANASA) 1000 MG suppository, Place 1 suppository (1,000 mg) rectally at bedtime., Disp: 30 suppository, Rfl: 11    mesalamine (LIALDA) 1.2 g DR tablet, Take 4 tablets (4,800 mg) by mouth daily (with breakfast)., Disp: 360 tablet, Rfl: 1    Omega-3 Fatty Acids (FISH OIL PO), , Disp: , Rfl:     spacer (OPTICHAMBER SUZANNE) holding chamber, To use with albuterol inhaler, Disp: 1 each, Rfl: 0    TYLENOL 325 MG OR TABS, Take by mouth every 6 hours as needed., Disp: , Rfl:     PROBLEM LIST  Patient Active Problem List    Diagnosis Date Noted    Obesity (BMI 35.0-39.9) with comorbidity (H) 09/11/2019     Priority: Medium    Arthritis 09/17/2015     Priority: Medium     Suspect gout      Ulcerative (chronic) proctitis (H) 01/13/2012     Priority: Medium    CARDIOVASCULAR SCREENING; LDL GOAL LESS THAN 160 10/31/2010     Priority: Medium    Atopic dermatitis 04/29/2009     Priority: Medium       PERTINENT PAST MEDICAL HISTORY:  Past Medical History:   Diagnosis Date    Atopic dermatitis        PREVIOUS SURGERIES:  Past Surgical History:   Procedure Laterality Date  "   COLONOSCOPY N/A 3/21/2025    Procedure: COLONOSCOPY, WITH POLYPECTOMY AND BIOPSY;  Surgeon: Hailee Swenson DO;  Location: [facility] OR    COLONOSCOPY WITH CO2 INSUFFLATION N/A 3/21/2025    Procedure: Colonoscopy with CO2 insufflation;  Surgeon: Hailee Swenson DO;  Location: [facility] OR    ZZC NONSPECIFIC PROCEDURE      left 2nd finger sugery       SOCIAL HISTORY:  Social History     Socioeconomic History    Marital status:      Spouse name: Not on file    Number of children: 1    Years of education: Not on file    Highest education level: Not on file   Occupational History     Employer: [employer]   Tobacco Use    Smoking status: Never    Smokeless tobacco: Never   Vaping Use    Vaping status: Never Used   Substance and Sexual Activity    Alcohol use: Not Currently     Comment: occasionally    Drug use: No    Sexual activity: Yes     Partners: Female     Comment: \"wife is using birth control but not sure what kind\"   Other Topics Concern     Service No    Blood Transfusions No    Caffeine Concern No    Occupational Exposure No    Hobby Hazards No    Sleep Concern No    Stress Concern No    Weight Concern No    Special Diet No    Back Care No    Exercise No    Bike Helmet No    Seat Belt Yes    Self-Exams No    Parent/sibling w/ CABG, MI or angioplasty before 65F 55M? No   Social History Narrative    Not on file     Social Drivers of Health     Financial Resource Strain: Low Risk  (5/3/2025)    Financial Resource Strain     Within the past 12 months, have you or your family members you live with been unable to get utilities (heat, electricity) when it was really needed?: No   Food Insecurity: Low Risk  (5/3/2025)    Food Insecurity     Within the past 12 months, did you worry that your food would run out before you got money to buy more?: No     Within the past 12 months, did the food you bought just not last and you didn t have money to get more?: No   Transportation Needs: Low Risk  (5/3/2025)    " Transportation Needs     Within the past 12 months, has lack of transportation kept you from medical appointments, getting your medicines, non-medical meetings or appointments, work, or from getting things that you need?: No   Physical Activity: Insufficiently Active (5/3/2025)    Exercise Vital Sign     Days of Exercise per Week: 1 day     Minutes of Exercise per Session: 10 min   Stress: No Stress Concern Present (5/3/2025)    [employer] - Occupational Stress Questionnaire     Feeling of Stress : Only a little   Social Connections: Unknown (5/3/2025)    Social Connection and Isolation Panel [NHANES]     Frequency of Communication with Friends and Family: Not on file     Frequency of Social Gatherings with Friends and Family: Once a week     Attends Yazidism Services: Not on file     Active Member of Clubs or Organizations: Not on file     Attends Club or Organization Meetings: Not on file     Marital Status: Not on file   Interpersonal Safety: Low Risk  (5/6/2025)    Interpersonal Safety     Do you feel physically and emotionally safe where you currently live?: Yes     Within the past 12 months, have you been hit, slapped, kicked or otherwise physically hurt by someone?: No     Within the past 12 months, have you been humiliated or emotionally abused in other ways by your partner or ex-partner?: No   Housing Stability: Low Risk  (5/3/2025)    Housing Stability     Do you have housing? : Yes     Are you worried about losing your housing?: No       FAMILY HISTORY:  Family History   Problem Relation Age of Onset    Cancer Mother         throat cancer    Cancer Paternal Grandfather         liver cancer    Cancer Paternal Aunt     Asthma No family hx of     C.A.D. No family hx of     Diabetes No family hx of     Hypertension No family hx of     Cerebrovascular Disease No family hx of     Breast Cancer No family hx of     Cancer - colorectal No family hx of     Prostate Cancer No family hx  "of     Alcohol/Drug No family hx of     Allergies No family hx of     Alzheimer Disease No family hx of     Anesthesia Reaction No family hx of     Arthritis No family hx of     Blood Disease No family hx of     Circulatory No family hx of     Connective Tissue Disorder No family hx of     Congenital Anomalies No family hx of     Cardiovascular No family hx of     Depression No family hx of     Endocrine Disease No family hx of     Eye Disorder No family hx of     Genetic Disorder No family hx of     Gastrointestinal Disease No family hx of     Genitourinary Problems No family hx of     Gynecology No family hx of     Heart Disease No family hx of     Lipids No family hx of     Musculoskeletal Disorder No family hx of     Neurologic Disorder No family hx of     Obesity No family hx of     Osteoporosis No family hx of     Psychotic Disorder No family hx of     Respiratory No family hx of     Thyroid Disease No family hx of     Hearing Loss No family hx of     Family History Negative No family hx of        Past/family/social history reviewed and no changes    PHYSICAL EXAMINATION:  Constitutional: aaox3, cooperative, pleasant, not dyspneic/diaphoretic, no acute distress  Vitals reviewed: /81 (BP Location: Left arm, Patient Position: Sitting, Cuff Size: Adult Regular)   Pulse 86   Ht 1.753 m (5' 9\")   Wt 109 kg (240 lb 3.2 oz)   SpO2 96%   BMI 35.47 kg/m    Wt:   Wt Readings from Last 2 Encounters:   07/02/25 109 kg (240 lb 3.2 oz)   05/06/25 108.3 kg (238 lb 11.2 oz)      Eyes: Sclera anicteric/injected  CV: No edema  Respiratory: Unlabored breathing  Skin: warm, perfused, no jaundice  Psych: Normal affect  MSK: Normal gait                    "

## 2025-07-02 NOTE — NURSING NOTE
"Chief Complaint   Patient presents with    Follow Up     Colitis follow up.     He requests these members of his care team be copied on today's visit information:  PCP: Amrita Garcia    Vitals:    07/02/25 0818   BP: 124/81   BP Location: Left arm   Patient Position: Sitting   Cuff Size: Adult Regular   Pulse: 86   SpO2: 96%   Weight: 109 kg (240 lb 3.2 oz)   Height: 1.753 m (5' 9\")     Body mass index is 35.47 kg/m .    Medications were reconciled.    Does patient need any medication refills at today's visit? Yes, Mesalamine oral.        Laura [place], [place]        "

## 2025-07-02 NOTE — LETTER
7/2/2025      Pavel Perez  [address]  [place] [address]      Dear Colleague,    Thank you for referring your patient, Pavel Perez, to the [facility]. Please see a copy of my visit note below.    FOLLOW UP GI CLINIC VISIT    CC/REFERRING MD:  Dex Ingram*  REASON FOR CONSULTATION:   Dex Ingram* for   Chief Complaint   Patient presents with     Follow Up     Colitis follow up.       ASSESSMENT/PLAN: Pavel Perez is a 49 year old male that is seen for follow up in the GI clinic today for follow-up of left-sided ulcerative colitis.  The addition of oral mesalamine resulted in pretty good symptom improvement but self tapering led to some recurrence, so I encouraged patient to maintain 4.8 g daily going forward.  Plan to update CRP and fecal calprotectin and colonoscopy in 3 to 4 months.  Reviewed that if he has an exacerbation of symptoms, he should let us know immediately.  We could consider adding mesalamine suppositories back in, though would also consider updating colonoscopy sooner at that point.  Reviewed that he should avoid NSAIDs, Tylenol safe.  We talked about the natural course of ulcerative colitis being a chronic condition and the need to maintain remission over the long-term.  We briefly touched on biologic therapies as well, which may be needed to control this condition over the long-term depending on how he does with continued use of mesalamine.    1. Left sided ulcerative colitis with rectal bleeding (H) (Primary)  - mesalamine (LIALDA) 1.2 g DR tablet; Take 4 tablets (4,800 mg) by mouth daily (with breakfast).  Dispense: 360 tablet; Refill: 1  - CRP, inflammation; Future  - Calprotectin Feces; Future  - Adult GI  Referral - Procedure Only; Future  - CRP, inflammation  - Calprotectin Feces      RTC 6 months    Thank you for this consultation.  It was a pleasure to participate in the care of this patient; please contact us with  any further questions.      28 minutes spent on the date of the encounter doing chart review, patient visit, and documentation    This note was created with voice recognition software, and while reviewed for accuracy, typos may remain.     Sergey Higgins PA-C  Division of Gastroenterology, Hepatology and Nutrition  [facility]  Pavel Perez is a 49 year old male that is seen for follow up in the GI clinic today for follow-up of ulcerative colitis.  This is my first time meeting patient.  He previously met with Dr. Hurst about 3 and half months ago.  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  Over the past several years, he had not been on any consistent therapy.  Due to increased stool frequency, loose stools, and blood in the stool this past fall, he was worked up further, ultimately undergoing colonoscopy in March of this year.  Shortly before that, he did meet with Dr. Dawson and was given mesalamine suppositories.  His colonoscopy revealed inflammation from the rectum to the descending colon and some patches of inflammation in the descending and transverse colon.  This was graded as Stahl score 2.  Biopsies consistent with chronic after colitis, Demetria grade 2.  Because of this, he was started on oral mesalamine 4 point grams daily.    He notes that initially, he had a pretty good response in terms of symptom resolution to the oral mesalamine.  He discontinued the mesalamine suppositories after about 2 weeks.  In May, he dropped down to 3.6 g mesalamine and continue to do all right, but then in June, dropped down to 2.4 g daily and had a recurrence of symptoms.  In the last couple of days, he has gone back up to 4.8 g again and stool is formed, no blood in the stool, no abdominal pain.  He has remained off of the mesalamine suppositories.    He did complete prebiologic labs with Dr. Dawson back in March, along with  baseline labs of CBC, CMP, which were normal.  CRP was elevated at 7.2, though better from 28.2 in February, and fecal calprotectin was elevated at 758.    Disease/Date of Dx:   2012  Extent/Location:   Left-sided (patchy inflammation in transverse colon)  Last Colonoscopy: Stahl 2 primarily left-sided but patchy through a transverse, Demetria grade 2 from descending colon to rectum  Last MRE/CTE: none  CRP level: 28.2 (though with proctitis flare and concurrent gout flare) -> 7.58 in March 2025  Calprotectin level: March 2025 - 758  Extraintestinal Manifestations:  none  Past Surgery:   none     Past Medications:  budesonide-completed therapy, mesalamine suppositories  Current Medications: 4.8 g mesalamine daily  Drug Levels:  Biologics: none  Prior TPMPT (none), prior thiopurine level (none)  History of tobacco:  none  History of c-diff:  no     Cancer Screening:  Next dysplasia screening is recommended: March 2027    ROS:    10 point ROS neg other than the symptoms noted above in the HPI.    ALLERGIES:   No Known Allergies    PERTINENT MEDICATIONS:    Current Outpatient Medications:      mesalamine (CANASA) 1000 MG suppository, Place 1 suppository (1,000 mg) rectally at bedtime., Disp: 30 suppository, Rfl: 11     mesalamine (LIALDA) 1.2 g DR tablet, Take 4 tablets (4,800 mg) by mouth daily (with breakfast)., Disp: 360 tablet, Rfl: 1     Omega-3 Fatty Acids (FISH OIL PO), , Disp: , Rfl:      spacer (OPTICHAMBER SUZANNE) holding chamber, To use with albuterol inhaler, Disp: 1 each, Rfl: 0     TYLENOL 325 MG OR TABS, Take by mouth every 6 hours as needed., Disp: , Rfl:     PROBLEM LIST  Patient Active Problem List    Diagnosis Date Noted     Obesity (BMI 35.0-39.9) with comorbidity (H) 09/11/2019     Priority: Medium     Arthritis 09/17/2015     Priority: Medium     Suspect gout       Ulcerative (chronic) proctitis (H) 01/13/2012     Priority: Medium     CARDIOVASCULAR SCREENING; LDL GOAL LESS THAN 160 10/31/2010      "Priority: Medium     Atopic dermatitis 04/29/2009     Priority: Medium       PERTINENT PAST MEDICAL HISTORY:  Past Medical History:   Diagnosis Date     Atopic dermatitis        PREVIOUS SURGERIES:  Past Surgical History:   Procedure Laterality Date     COLONOSCOPY N/A 3/21/2025    Procedure: COLONOSCOPY, WITH POLYPECTOMY AND BIOPSY;  Surgeon: Hailee Swenson DO;  Location: [facility] OR     COLONOSCOPY WITH CO2 INSUFFLATION N/A 3/21/2025    Procedure: Colonoscopy with CO2 insufflation;  Surgeon: Hailee Swenson DO;  Location: [facility] OR     ZZC NONSPECIFIC PROCEDURE      left 2nd finger sugery       SOCIAL HISTORY:  Social History     Socioeconomic History     Marital status:      Spouse name: Not on file     Number of children: 1     Years of education: Not on file     Highest education level: Not on file   Occupational History     Employer: [employer]   Tobacco Use     Smoking status: Never     Smokeless tobacco: Never   Vaping Use     Vaping status: Never Used   Substance and Sexual Activity     Alcohol use: Not Currently     Comment: occasionally     Drug use: No     Sexual activity: Yes     Partners: Female     Comment: \"wife is using birth control but not sure what kind\"   Other Topics Concern      Service No     Blood Transfusions No     Caffeine Concern No     Occupational Exposure No     Hobby Hazards No     Sleep Concern No     Stress Concern No     Weight Concern No     Special Diet No     Back Care No     Exercise No     Bike Helmet No     Seat Belt Yes     Self-Exams No     Parent/sibling w/ CABG, MI or angioplasty before 65F 55M? No   Social History Narrative     Not on file     Social Drivers of Health     Financial Resource Strain: Low Risk  (5/3/2025)    Financial Resource Strain      Within the past 12 months, have you or your family members you live with been unable to get utilities (heat, electricity) when it was really needed?: No   Food Insecurity: Low Risk  (5/3/2025)    Food " Insecurity      Within the past 12 months, did you worry that your food would run out before you got money to buy more?: No      Within the past 12 months, did the food you bought just not last and you didn t have money to get more?: No   Transportation Needs: Low Risk  (5/3/2025)    Transportation Needs      Within the past 12 months, has lack of transportation kept you from medical appointments, getting your medicines, non-medical meetings or appointments, work, or from getting things that you need?: No   Physical Activity: Insufficiently Active (5/3/2025)    Exercise Vital Sign      Days of Exercise per Week: 1 day      Minutes of Exercise per Session: 10 min   Stress: No Stress Concern Present (5/3/2025)    [employer] [employer] - Occupational Stress Questionnaire      Feeling of Stress : Only a little   Social Connections: Unknown (5/3/2025)    Social Connection and Isolation Panel [NHANES]      Frequency of Communication with Friends and Family: Not on file      Frequency of Social Gatherings with Friends and Family: Once a week      Attends Jewish Services: Not on file      Active Member of Clubs or Organizations: Not on file      Attends Club or Organization Meetings: Not on file      Marital Status: Not on file   Interpersonal Safety: Low Risk  (5/6/2025)    Interpersonal Safety      Do you feel physically and emotionally safe where you currently live?: Yes      Within the past 12 months, have you been hit, slapped, kicked or otherwise physically hurt by someone?: No      Within the past 12 months, have you been humiliated or emotionally abused in other ways by your partner or ex-partner?: No   Housing Stability: Low Risk  (5/3/2025)    Housing Stability      Do you have housing? : Yes      Are you worried about losing your housing?: No       FAMILY HISTORY:  Family History   Problem Relation Age of Onset     Cancer Mother         throat cancer     Cancer Paternal Grandfather          "liver cancer     Cancer Paternal Aunt      Asthma No family hx of      C.A.D. No family hx of      Diabetes No family hx of      Hypertension No family hx of      Cerebrovascular Disease No family hx of      Breast Cancer No family hx of      Cancer - colorectal No family hx of      Prostate Cancer No family hx of      Alcohol/Drug No family hx of      Allergies No family hx of      Alzheimer Disease No family hx of      Anesthesia Reaction No family hx of      Arthritis No family hx of      Blood Disease No family hx of      Circulatory No family hx of      Connective Tissue Disorder No family hx of      Congenital Anomalies No family hx of      Cardiovascular No family hx of      Depression No family hx of      Endocrine Disease No family hx of      Eye Disorder No family hx of      Genetic Disorder No family hx of      Gastrointestinal Disease No family hx of      Genitourinary Problems No family hx of      Gynecology No family hx of      Heart Disease No family hx of      Lipids No family hx of      Musculoskeletal Disorder No family hx of      Neurologic Disorder No family hx of      Obesity No family hx of      Osteoporosis No family hx of      Psychotic Disorder No family hx of      Respiratory No family hx of      Thyroid Disease No family hx of      Hearing Loss No family hx of      Family History Negative No family hx of        Past/family/social history reviewed and no changes    PHYSICAL EXAMINATION:  Constitutional: aaox3, cooperative, pleasant, not dyspneic/diaphoretic, no acute distress  Vitals reviewed: /81 (BP Location: Left arm, Patient Position: Sitting, Cuff Size: Adult Regular)   Pulse 86   Ht 1.753 m (5' 9\")   Wt 109 kg (240 lb 3.2 oz)   SpO2 96%   BMI 35.47 kg/m    Wt:   Wt Readings from Last 2 Encounters:   07/02/25 109 kg (240 lb 3.2 oz)   05/06/25 108.3 kg (238 lb 11.2 oz)      Eyes: Sclera anicteric/injected  CV: No edema  Respiratory: Unlabored breathing  Skin: warm, perfused, " no jaundice  Psych: Normal affect  MSK: Normal gait                      Again, thank you for allowing me to participate in the care of your patient.        Sincerely,        Sergey Higgins PA-C    Electronically signed

## 2025-07-02 NOTE — PATIENT INSTRUCTIONS
- Continue with 4 tablets of mesalamine daily  - If there is a flare of some rectal bleeding or diarrhea, you may use the mesalamine suppository for a few weeks to calm things down, just let us know if this happens  - Complete blood and stool testing today to check on inflammation level  - We will call to schedule follow up colonoscopy for this fall   - If you have any worsening symptoms, please let us know

## 2025-07-03 ENCOUNTER — MYC MEDICAL ADVICE (OUTPATIENT)
Dept: GASTROENTEROLOGY | Facility: CLINIC | Age: 50
End: 2025-07-03
Payer: COMMERCIAL

## 2025-07-09 LAB — CALPROTECTIN STL-MCNT: 400 MG/KG (ref 0–49.9)

## (undated) DEVICE — PAD CHUX UNDERPAD 23X24" 7136

## (undated) DEVICE — KIT ENDO FIRST STEP DISINFECTANT 200ML W/POUCH EP-4

## (undated) DEVICE — LIFTER SURGICAL ASCENDO SUBMUCOSAL LIFT AGENT BX00712934

## (undated) DEVICE — PREP CHLORAPREP 26ML TINTED ORANGE  260815

## (undated) RX ORDER — PROPOFOL 10 MG/ML
INJECTION, EMULSION INTRAVENOUS
Status: DISPENSED
Start: 2025-03-21